# Patient Record
Sex: MALE | Race: WHITE | NOT HISPANIC OR LATINO | Employment: FULL TIME | URBAN - METROPOLITAN AREA
[De-identification: names, ages, dates, MRNs, and addresses within clinical notes are randomized per-mention and may not be internally consistent; named-entity substitution may affect disease eponyms.]

---

## 2017-01-11 ENCOUNTER — APPOINTMENT (OUTPATIENT)
Dept: CT IMAGING | Facility: CLINIC | Age: 32
End: 2017-01-11
Attending: EMERGENCY MEDICINE
Payer: COMMERCIAL

## 2017-01-11 ENCOUNTER — APPOINTMENT (OUTPATIENT)
Dept: GENERAL RADIOLOGY | Facility: CLINIC | Age: 32
End: 2017-01-11
Attending: EMERGENCY MEDICINE
Payer: COMMERCIAL

## 2017-01-11 ENCOUNTER — HOSPITAL ENCOUNTER (EMERGENCY)
Facility: CLINIC | Age: 32
Discharge: HOME | End: 2017-01-11
Attending: EMERGENCY MEDICINE | Admitting: EMERGENCY MEDICINE
Payer: COMMERCIAL

## 2017-01-11 VITALS
RESPIRATION RATE: 16 BRPM | OXYGEN SATURATION: 96 % | DIASTOLIC BLOOD PRESSURE: 93 MMHG | HEART RATE: 69 BPM | SYSTOLIC BLOOD PRESSURE: 137 MMHG | TEMPERATURE: 97.5 F

## 2017-01-11 DIAGNOSIS — R07.9 NONSPECIFIC CHEST PAIN: ICD-10-CM

## 2017-01-11 DIAGNOSIS — R20.2 PARESTHESIA: ICD-10-CM

## 2017-01-11 LAB
ALBUMIN SERPL-MCNC: 3.8 G/DL (ref 3.4–5)
ALP SERPL-CCNC: 97 U/L (ref 40–150)
ALT SERPL W P-5'-P-CCNC: 32 U/L (ref 0–70)
ANION GAP SERPL CALCULATED.3IONS-SCNC: 8 MMOL/L (ref 3–14)
AST SERPL W P-5'-P-CCNC: 18 U/L (ref 0–45)
BASOPHILS # BLD AUTO: 0.1 10E9/L (ref 0–0.2)
BASOPHILS NFR BLD AUTO: 0.6 %
BILIRUB SERPL-MCNC: 0.3 MG/DL (ref 0.2–1.3)
BUN SERPL-MCNC: 15 MG/DL (ref 7–30)
CALCIUM SERPL-MCNC: 8.8 MG/DL (ref 8.5–10.1)
CHLORIDE SERPL-SCNC: 106 MMOL/L (ref 94–109)
CO2 SERPL-SCNC: 27 MMOL/L (ref 20–32)
CREAT SERPL-MCNC: 1.07 MG/DL (ref 0.66–1.25)
D DIMER PPP FEU-MCNC: 0.3 UG/ML FEU (ref 0–0.5)
DIFFERENTIAL METHOD BLD: NORMAL
EOSINOPHIL # BLD AUTO: 0.2 10E9/L (ref 0–0.7)
EOSINOPHIL NFR BLD AUTO: 1.5 %
ERYTHROCYTE [DISTWIDTH] IN BLOOD BY AUTOMATED COUNT: 13.5 % (ref 10–15)
GFR SERPL CREATININE-BSD FRML MDRD: 80 ML/MIN/1.7M2
GLUCOSE SERPL-MCNC: 107 MG/DL (ref 70–99)
HCT VFR BLD AUTO: 44.2 % (ref 40–53)
HGB BLD-MCNC: 14.3 G/DL (ref 13.3–17.7)
IMM GRANULOCYTES # BLD: 0 10E9/L (ref 0–0.4)
IMM GRANULOCYTES NFR BLD: 0.4 %
INTERPRETATION ECG - MUSE: NORMAL
LIPASE SERPL-CCNC: 101 U/L (ref 73–393)
LYMPHOCYTES # BLD AUTO: 3.4 10E9/L (ref 0.8–5.3)
LYMPHOCYTES NFR BLD AUTO: 32.2 %
MCH RBC QN AUTO: 27.8 PG (ref 26.5–33)
MCHC RBC AUTO-ENTMCNC: 32.4 G/DL (ref 31.5–36.5)
MCV RBC AUTO: 86 FL (ref 78–100)
MONOCYTES # BLD AUTO: 0.7 10E9/L (ref 0–1.3)
MONOCYTES NFR BLD AUTO: 6.6 %
NEUTROPHILS # BLD AUTO: 6.1 10E9/L (ref 1.6–8.3)
NEUTROPHILS NFR BLD AUTO: 58.7 %
NRBC # BLD AUTO: 0 10*3/UL
NRBC BLD AUTO-RTO: 0 /100
PLATELET # BLD AUTO: 265 10E9/L (ref 150–450)
POTASSIUM SERPL-SCNC: 3.9 MMOL/L (ref 3.4–5.3)
PROT SERPL-MCNC: 7.5 G/DL (ref 6.8–8.8)
RBC # BLD AUTO: 5.14 10E12/L (ref 4.4–5.9)
SODIUM SERPL-SCNC: 141 MMOL/L (ref 133–144)
TROPONIN I SERPL-MCNC: NORMAL UG/L (ref 0–0.04)
WBC # BLD AUTO: 10.4 10E9/L (ref 4–11)

## 2017-01-11 PROCEDURE — 25500064 ZZH RX 255 OP 636: Performed by: EMERGENCY MEDICINE

## 2017-01-11 PROCEDURE — 93005 ELECTROCARDIOGRAM TRACING: CPT

## 2017-01-11 PROCEDURE — 96361 HYDRATE IV INFUSION ADD-ON: CPT

## 2017-01-11 PROCEDURE — 25000132 ZZH RX MED GY IP 250 OP 250 PS 637: Performed by: EMERGENCY MEDICINE

## 2017-01-11 PROCEDURE — 71020 XR CHEST 2 VW: CPT

## 2017-01-11 PROCEDURE — 25000125 ZZHC RX 250: Performed by: EMERGENCY MEDICINE

## 2017-01-11 PROCEDURE — 96374 THER/PROPH/DIAG INJ IV PUSH: CPT | Mod: 59

## 2017-01-11 PROCEDURE — 25000128 H RX IP 250 OP 636: Performed by: EMERGENCY MEDICINE

## 2017-01-11 PROCEDURE — 83690 ASSAY OF LIPASE: CPT | Performed by: EMERGENCY MEDICINE

## 2017-01-11 PROCEDURE — 84484 ASSAY OF TROPONIN QUANT: CPT | Performed by: EMERGENCY MEDICINE

## 2017-01-11 PROCEDURE — 85025 COMPLETE CBC W/AUTO DIFF WBC: CPT | Performed by: EMERGENCY MEDICINE

## 2017-01-11 PROCEDURE — 99285 EMERGENCY DEPT VISIT HI MDM: CPT | Mod: 25

## 2017-01-11 PROCEDURE — 80053 COMPREHEN METABOLIC PANEL: CPT | Performed by: EMERGENCY MEDICINE

## 2017-01-11 PROCEDURE — 74177 CT ABD & PELVIS W/CONTRAST: CPT

## 2017-01-11 PROCEDURE — 85379 FIBRIN DEGRADATION QUANT: CPT | Performed by: EMERGENCY MEDICINE

## 2017-01-11 RX ORDER — ASPIRIN 325 MG
325 TABLET ORAL ONCE
Status: COMPLETED | OUTPATIENT
Start: 2017-01-11 | End: 2017-01-11

## 2017-01-11 RX ORDER — IOPAMIDOL 755 MG/ML
500 INJECTION, SOLUTION INTRAVASCULAR ONCE
Status: COMPLETED | OUTPATIENT
Start: 2017-01-11 | End: 2017-01-11

## 2017-01-11 RX ORDER — SODIUM CHLORIDE 9 MG/ML
1000 INJECTION, SOLUTION INTRAVENOUS CONTINUOUS
Status: DISCONTINUED | OUTPATIENT
Start: 2017-01-11 | End: 2017-01-11 | Stop reason: HOSPADM

## 2017-01-11 RX ORDER — HYDROMORPHONE HYDROCHLORIDE 1 MG/ML
0.5 INJECTION, SOLUTION INTRAMUSCULAR; INTRAVENOUS; SUBCUTANEOUS
Status: DISCONTINUED | OUTPATIENT
Start: 2017-01-11 | End: 2017-01-11 | Stop reason: HOSPADM

## 2017-01-11 RX ADMIN — SODIUM CHLORIDE 1000 ML: 9 INJECTION, SOLUTION INTRAVENOUS at 01:24

## 2017-01-11 RX ADMIN — IOPAMIDOL 86 ML: 755 INJECTION, SOLUTION INTRAVENOUS at 02:34

## 2017-01-11 RX ADMIN — SODIUM CHLORIDE 62 ML: 9 INJECTION, SOLUTION INTRAVENOUS at 02:34

## 2017-01-11 RX ADMIN — ASPIRIN 325 MG ORAL TABLET 325 MG: 325 PILL ORAL at 01:19

## 2017-01-11 RX ADMIN — LIDOCAINE HYDROCHLORIDE 30 ML: 20 SOLUTION ORAL; TOPICAL at 01:20

## 2017-01-11 RX ADMIN — HYDROMORPHONE HYDROCHLORIDE 0.5 MG: 1 INJECTION, SOLUTION INTRAMUSCULAR; INTRAVENOUS; SUBCUTANEOUS at 02:11

## 2017-01-11 ASSESSMENT — ENCOUNTER SYMPTOMS
NAUSEA: 1
SHORTNESS OF BREATH: 1
VOMITING: 0
COUGH: 0
FEVER: 0
DIZZINESS: 0
ABDOMINAL PAIN: 0
DIAPHORESIS: 1

## 2017-01-11 NOTE — ED AVS SNAPSHOT
[facility] Emergency Department    [address]    [facility] [address]    Phone:  [phone]    Fax:  [phone]                                       Jacinto Cruz   MRN: [id number]    Department:  [facility] Emergency Department   Date of Visit:  1/11/2017           Patient Information     Date Of Birth          1985        Your diagnoses for this visit were:     Nonspecific chest pain     Paresthesia        You were seen by Rosangela Bridges MD.      Follow-up Information     Follow up with No Ref-Primary, Physician. Schedule an appointment as soon as possible for a visit in 2 days.        Discharge Instructions       Your tests did not show a specific cause of your symptoms, but your tests show no signs of a specific/emergency cause due to the heart, lungs or abdomen. It is possible you may have a strained muscle and a pinched nerve in the neck.    For pain: take Tylenol 650-1000mg up to every 6 hours with food. No strenuous activity with the arm.     If you have worse or different symptoms seek medical care right away.       *CHEST PAIN, UNCERTAIN CAUSE    Based on your exam today, the exact cause of your chest pain is not certain. Your condition does not seem serious at this time, and your pain does not appear to be coming from your heart. However, sometimes the signs of a serious problem take more time to appear. Therefore, watch for the warning signs listed below.  HOME CARE:    Rest today and avoid strenuous activity.    Take any prescribed medicine as directed.  FOLLOW UP with your doctor in 1-3 days.   GET PROMPT MEDICAL ATTENTION if any of the following occur:    A change in the type of pain: if it feels different, becomes more severe, lasts longer, or begins to spread into your shoulder, arm, neck, jaw or back    Shortness of breath or increased pain with breathing    Weakness, dizziness, or fainting    Cough with blood or dark colored sputum  (phlegm)    Fever over 101  F (38.3  C)    Swelling, pain or redness in one leg    2754-1224 [facility], [address] [place] [address], [place] [address]. All rights reserved. This information is not intended as a substitute for professional medical care. Always follow your healthcare professional's instructions.      Pinched Nerve in the Neck  A pinched nerve in the neck (cervical radiculopathy) is caused when the nerve that goes from the spinal cord to the arm is irritated or has pressure on it. This may be caused by a bulging spinal disk. A spinal disk is the cushion between each spinal bone. Or it may be caused by a narrowing of the spinal joint because of arthritis.    A pinched nerve can cause numbness, tingling, deep aching, or electrical shooting pain from the side of the neck all the way down to the fingers on one side.  A pinched nerve may begin after a sudden turning or bending force (such as in a car accident) or after a simple awkward movement. In either case, muscle spasm is commonly present and adds to the pain.  Home care  Follow these guidelines when caring for yourself at home:    Rest and relax the muscles. Use a comfortable pillow that supports your head and keeps your spine in a natural (neutral) position. Your head shouldn t be tilted forward or backward. A rolled-up towel may help for a custom fit. When standing or sitting, keep your neck in line with your body. Keep your head up and shoulders down. Stay away from activities that require you to move your neck a lot.    You can use heat and massage to help ease the pain. Take a hot shower or bath, or use a heating pad. You can also use a cold pack for relief. You can make a cold pack by wrapping a plastic bag of crushed or cubed ice in a thin towel. Try both heat and cold, and use the method that feels best. Do this for 20 minutes several times a day.    You may use acetaminophen or ibuprofen to control pain, unless another pain medicine was  prescribed. If you have chronic liver or kidney disease, talk with your health care provider before using these medicines. Also talk with your provider if you ve had a stomach ulcer or GI bleeding.    Reduce stress. Stress can make it longer for your pain to go away.    Do any exercises or stretches that were given to you as part of your discharge plan.    Wear a soft collar, if prescribed.    You may need surgery for a more serious injury.  Follow-up care  Follow up with your health care provider, or as advised, if you don t start to get better after 1 week. You may need more tests. Tell your provider about any fever, chills, or weight loss.  If X-rays were taken, a radiologist will look at them. You will be told of any new findings that may affect your care.  When to seek medical advice  Call your health care provider right away if any of these occur:    Pain becomes worse even after taking prescribed pain medicine    Weakness in the arm    Numbness in the arm gets worse    Trouble breathing or swallowing       1568-8626 [employer]. [address] [place] [address]. All rights reserved. This information is not intended as a substitute for professional medical care. Always follow your healthcare professional's instructions.          24 Hour Appointment Hotline       To make an appointment at any [facility], call [phone] ([phone]). If you don't have a family doctor or clinic, we will help you find one. [facility] clinics are conveniently located to serve the needs of you and your family.             Review of your medicines      Notice     You have not been prescribed any medications.            Procedures and tests performed during your visit     CBC with platelets differential    CT Abdomen Pelvis w Contrast    Comprehensive metabolic panel    D dimer quantitative    EKG 12 lead    Lipase    Troponin I    XR Chest 2 Views      Orders Needing Specimen Collection     None       Pending Results     Date and Time Order Name Status Description    1/11/2017 0045 EKG 12 lead Preliminary             Pending Culture Results     No orders found from 1/10/2017 to 1/12/2017.       Test Results from your hospital stay           1/11/2017  1:26 AM - Interface, Flexilab Results      Component Results     Component Value Ref Range & Units Status    WBC 10.4 4.0 - 11.0 10e9/L Final    RBC Count 5.14 4.4 - 5.9 10e12/L Final    Hemoglobin 14.3 13.3 - 17.7 g/dL Final    Hematocrit 44.2 40.0 - 53.0 % Final    MCV 86 78 - 100 fl Final    MCH 27.8 26.5 - 33.0 pg Final    MCHC 32.4 31.5 - 36.5 g/dL Final    RDW 13.5 10.0 - 15.0 % Final    Platelet Count 265 150 - 450 10e9/L Final    Diff Method Automated Method  Final    % Neutrophils 58.7 % Final    % Lymphocytes 32.2 % Final    % Monocytes 6.6 % Final    % Eosinophils 1.5 % Final    % Basophils 0.6 % Final    % Immature Granulocytes 0.4 % Final    Nucleated RBCs 0 0 /100 Final    Absolute Neutrophil 6.1 1.6 - 8.3 10e9/L Final    Absolute Lymphocytes 3.4 0.8 - 5.3 10e9/L Final    Absolute Monocytes 0.7 0.0 - 1.3 10e9/L Final    Absolute Eosinophils 0.2 0.0 - 0.7 10e9/L Final    Absolute Basophils 0.1 0.0 - 0.2 10e9/L Final    Abs Immature Granulocytes 0.0 0 - 0.4 10e9/L Final    Absolute Nucleated RBC 0.0  Final         1/11/2017  1:47 AM - Interface, Flexilab Results      Component Results     Component Value Ref Range & Units Status    Troponin I ES  0.000 - 0.045 ug/L Final    <0.015  The 99th percentile for upper reference range is 0.045 ug/L.  Troponin values in   the range of 0.045 - 0.120 ug/L may be associated with risks of adverse   clinical events.           1/11/2017  1:50 AM - Interface, Flexilab Results      Component Results     Component Value Ref Range & Units Status    Sodium 141 133 - 144 mmol/L Final    Potassium 3.9 3.4 - 5.3 mmol/L Final    Chloride 106 94 - 109 mmol/L Final    Carbon Dioxide 27 20 - 32 mmol/L Final    Anion Gap 8 3 - 14  mmol/L Final    Glucose 107 (H) 70 - 99 mg/dL Final    Urea Nitrogen 15 7 - 30 mg/dL Final    Creatinine 1.07 0.66 - 1.25 mg/dL Final    GFR Estimate 80 >60 mL/min/1.7m2 Final    Non  GFR Calc    GFR Estimate If Black >90   GFR Calc   >60 mL/min/1.7m2 Final    Calcium 8.8 8.5 - 10.1 mg/dL Final    Bilirubin Total 0.3 0.2 - 1.3 mg/dL Final    Albumin 3.8 3.4 - 5.0 g/dL Final    Protein Total 7.5 6.8 - 8.8 g/dL Final    Alkaline Phosphatase 97 40 - 150 U/L Final    ALT 32 0 - 70 U/L Final    AST 18 0 - 45 U/L Final         1/11/2017  1:47 AM - Interface, Flexilab Results      Component Results     Component Value Ref Range & Units Status    Lipase 101 73 - 393 U/L Final         1/11/2017  1:39 AM - Interface, Flexilab Results      Component Results     Component Value Ref Range & Units Status    D Dimer 0.3 0.0 - 0.50 ug/ml FEU Final    This D-dimer assay is intended for use in conjuntion with a clinical pretest   probability assessment model to exclude pulmonary embolism (PE) and as an aid   in the diagnosis of deep venous thrombosis (DVT) in outpatients suspected of   PE   or DVT. The cut-off value is 0.5 g/mL FEU.           1/11/2017  3:03 AM - Interface, Radiant Ib      Narrative     CHEST 2 VIEWS   1/11/2017 2:56 AM     HISTORY: Chest pain.    COMPARISON: None.    FINDINGS: The lungs are clear. Normal-sized cardiac silhouette.        Impression     IMPRESSION: No evidence of active cardiopulmonary disease.    CHRISTINE ZHU MD         1/11/2017  3:05 AM - Interface, Radiant Ib      Narrative     CT ABDOMEN AND PELVIS WITH CONTRAST   1/11/2017 2:44 AM     HISTORY: Upper abdominal/chest pain.    COMPARISON: None.    TECHNIQUE: Following the uneventful administration of 86 mL Isovue-370  intravenous contrast, helical sections were acquired from the top of  the diaphragm through the pubic symphysis. Coronal reconstructions  were generated. Radiation dose for this scan was reduced  using  automated exposure control, adjustment of the mA and/or kV according  to the patient's size, or iterative reconstruction technique.    FINDINGS:     Abdomen: A few subcentimeter low-attenuation lesions in the liver, too  small to characterize. The spleen, pancreas, adrenal glands and  kidneys are unremarkable. The gallbladder is present. No enlarged  lymph nodes or free fluid in the upper abdomen.    Scan through the lower chest is unremarkable.    Pelvis: The small and large bowel are normal in caliber. The appendix  is unremarkable. No bowel wall thickening, pneumatosis or free  intraperitoneal gas. No enlarged lymph nodes or free fluid in the  pelvis. Tiny paraumbilical hernia containing fat.        Impression     IMPRESSION: No cause of acute pain identified in the abdomen or  pelvis. The appendix is unremarkable.    CHRISTINE ZHU MD                Clinical Quality Measure: Blood Pressure Screening     Your blood pressure was checked while you were in the emergency department today. The last reading we obtained was  BP: (!) 137/93 mmHg . Please read the guidelines below about what these numbers mean and what you should do about them.  If your systolic blood pressure (the top number) is less than 120 and your diastolic blood pressure (the bottom number) is less than 80, then your blood pressure is normal. There is nothing more that you need to do about it.  If your systolic blood pressure (the top number) is 120-139 or your diastolic blood pressure (the bottom number) is 80-89, your blood pressure may be higher than it should be. You should have your blood pressure rechecked within a year by a primary care provider.  If your systolic blood pressure (the top number) is 140 or greater or your diastolic blood pressure (the bottom number) is 90 or greater, you may have high blood pressure. High blood pressure is treatable, but if left untreated over time it can put you at risk for heart attack, stroke, or  "kidney failure. You should have your blood pressure rechecked by a primary care provider within the next 4 weeks.  If your provider in the emergency department today gave you specific instructions to follow-up with your doctor or provider even sooner than that, you should follow that instruction and not wait for up to 4 weeks for your follow-up visit.        Thank you for choosing [facility]       Thank you for choosing [facility] for your care. Our goal is always to provide you with excellent care. Hearing back from our patients is one way we can continue to improve our services. Please take a few minutes to complete the written survey that you may receive in the mail after you visit with us. Thank you!        [employer] Information     [employer] lets you send messages to your doctor, view your test results, renew your prescriptions, schedule appointments and more. To sign up, go to [email or web address] . Click on \"Log in\" on the left side of the screen, which will take you to the Welcome page. Then click on \"Sign up Now\" on the right side of the page.     You will be asked to enter the access code listed below, as well as some personal information. Please follow the directions to create your username and password.     Your access code is: [email or web address]  Expires: 2017  3:23 AM     Your access code will  in 90 days. If you need help or a new code, please call your [facility] clinic or [phone].        Care EveryWhere ID     This is your Care EveryWhere ID. This could be used by other organizations to access your [facility] medical records  [id number]        After Visit Summary       This is your record. Keep this with you and show to your community pharmacist(s) and doctor(s) at your next visit.                  "

## 2017-01-11 NOTE — ED NOTES
Patient here for evaluation of chest pain that started yesterday in the morning. The pain has been constant since and is radiating to left arm and hand. Patient denies jaw pain, headache, or nausea or vomiting. Patient is AOX4, ABC's intact.

## 2017-01-11 NOTE — ED AVS SNAPSHOT
[facility] Emergency Department    [address]    [facility] [address]    Phone:  [phone]    Fax:  [phone]                                       Jacinto Cruz   MRN: [id number]    Department:  [facility] Emergency Department   Date of Visit:  1/11/2017           After Visit Summary Signature Page     I have received my discharge instructions, and my questions have been answered. I have discussed any challenges I see with this plan with the nurse or doctor.    ..........................................................................................................................................  Patient/Patient Representative Signature      ..........................................................................................................................................  Patient Representative Print Name and Relationship to Patient    ..................................................               ................................................  Date                                            Time    ..........................................................................................................................................  Reviewed by Signature/Title    ...................................................              ..............................................  Date                                                            Time

## 2017-01-11 NOTE — DISCHARGE INSTRUCTIONS
Your tests did not show a specific cause of your symptoms, but your tests show no signs of a specific/emergency cause due to the heart, lungs or abdomen. It is possible you may have a strained muscle and a pinched nerve in the neck.    For pain: take Tylenol 650-1000mg up to every 6 hours with food. No strenuous activity with the arm.     If you have worse or different symptoms seek medical care right away.       *CHEST PAIN, UNCERTAIN CAUSE    Based on your exam today, the exact cause of your chest pain is not certain. Your condition does not seem serious at this time, and your pain does not appear to be coming from your heart. However, sometimes the signs of a serious problem take more time to appear. Therefore, watch for the warning signs listed below.  HOME CARE:    Rest today and avoid strenuous activity.    Take any prescribed medicine as directed.  FOLLOW UP with your doctor in 1-3 days.   GET PROMPT MEDICAL ATTENTION if any of the following occur:    A change in the type of pain: if it feels different, becomes more severe, lasts longer, or begins to spread into your shoulder, arm, neck, jaw or back    Shortness of breath or increased pain with breathing    Weakness, dizziness, or fainting    Cough with blood or dark colored sputum (phlegm)    Fever over 101  F (38.3  C)    Swelling, pain or redness in one leg    5382-3311 [place] [address]. All rights reserved. This information is not intended as a substitute for professional medical care. Always follow your healthcare professional's instructions.      Pinched Nerve in the Neck  A pinched nerve in the neck (cervical radiculopathy) is caused when the nerve that goes from the spinal cord to the arm is irritated or has pressure on it. This may be caused by a bulging spinal disk. A spinal disk is the cushion between each spinal bone. Or it may be caused by a narrowing of the spinal joint because of arthritis.    A pinched  nerve can cause numbness, tingling, deep aching, or electrical shooting pain from the side of the neck all the way down to the fingers on one side.  A pinched nerve may begin after a sudden turning or bending force (such as in a car accident) or after a simple awkward movement. In either case, muscle spasm is commonly present and adds to the pain.  Home care  Follow these guidelines when caring for yourself at home:    Rest and relax the muscles. Use a comfortable pillow that supports your head and keeps your spine in a natural (neutral) position. Your head shouldn t be tilted forward or backward. A rolled-up towel may help for a custom fit. When standing or sitting, keep your neck in line with your body. Keep your head up and shoulders down. Stay away from activities that require you to move your neck a lot.    You can use heat and massage to help ease the pain. Take a hot shower or bath, or use a heating pad. You can also use a cold pack for relief. You can make a cold pack by wrapping a plastic bag of crushed or cubed ice in a thin towel. Try both heat and cold, and use the method that feels best. Do this for 20 minutes several times a day.    You may use acetaminophen or ibuprofen to control pain, unless another pain medicine was prescribed. If you have chronic liver or kidney disease, talk with your health care provider before using these medicines. Also talk with your provider if you ve had a stomach ulcer or GI bleeding.    Reduce stress. Stress can make it longer for your pain to go away.    Do any exercises or stretches that were given to you as part of your discharge plan.    Wear a soft collar, if prescribed.    You may need surgery for a more serious injury.  Follow-up care  Follow up with your health care provider, or as advised, if you don t start to get better after 1 week. You may need more tests. Tell your provider about any fever, chills, or weight loss.  If X-rays were taken, a radiologist will  look at them. You will be told of any new findings that may affect your care.  When to seek medical advice  Call your health care provider right away if any of these occur:    Pain becomes worse even after taking prescribed pain medicine    Weakness in the arm    Numbness in the arm gets worse    Trouble breathing or swallowing       9222-7838 [employer]. [address] [place] [address], [place] [address]. All rights reserved. This information is not intended as a substitute for professional medical care. Always follow your healthcare professional's instructions.

## 2017-01-11 NOTE — ED PROVIDER NOTES
History     Chief Complaint:  Chest Pain    HPI   Jacinto Cruz is a 31 year lefthanded old male who presents to the emergency department today for evaluation of chest pain. The patient reports that Monday morning 1/9/2017 he started experiencing some chest pain on the left side which seems to radiate into his arm and shoulder. He states that when he rolls onto his side it seems to be worse and is not exacerbated with exertion. 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 Furthermore, the patient notes some tingling in his left hand and has been diaphoretic intermittently since Monday. Moreover, the patient does note that he went to [place] on December 27th and did drive for longer than 4 hours at a time, but he denies any calf pain or leg swelling. He also denies any cough, jaw pain, emesis, fevers, abdominal pain, or other symptoms at this time.     Cardiac/PE/DVT Risk Factors:  History of hypertension - Negative  History of hyperlipidemia - Negative  History of diabetes - Negative  History of smoking - Positive  Personal history of PE/DVT - Negative  Family history of PE/DVT - Negative  Family history of heart complications - Negative  Recent travel - Positive  Recent surgery - Negative  Other immobilizations - Negative  Cancer - Negative    Allergies:  No Known Drug Allergies     Medications:    The patient is currently on no regular medications.      Past Medical History:    GERD    Past Surgical History:    Left elbow surgery  Discectomy lumbar posterior microscopic one level, left (2015)     Family History:    History reviewed. No pertinent family history.     Social History:  The patient was accompanied to the ED by his girlfriend.  Smoking Status: Former Smoker  Alcohol Use: Positive  Marital Status:  Single [1]   Drug Use: Former cannabis and cocaine, not for several years    Review of  Systems   Constitutional: Positive for diaphoresis. Negative for fever.   Respiratory: Positive for shortness of breath. Negative for cough.    Cardiovascular: Positive for chest pain (Left side radiating into left arm/shoulder). Negative for leg swelling.   Gastrointestinal: Positive for nausea. Negative for vomiting and abdominal pain.   Neurological: Negative for dizziness.   All other systems reviewed and are negative.    Physical Exam   Vitals:  Patient Vitals for the past 24 hrs:   BP Temp Temp src Pulse Heart Rate Resp SpO2   01/11/17 0115 (!) 137/93 mmHg - - - 71 - 96 %   01/11/17 0100 (!) 146/113 mmHg - - - 74 - 96 %   01/11/17 0045 (!) 147/101 mmHg - - - 77 - 98 %   01/11/17 0041 (!) 146/110 mmHg 97.5  F (36.4  C) Oral 69 - 16 98 %      Physical Exam  Constitutional:  Well developed, Well nourished, Completely comfortable appearing.   HENT:  Bilateral external ears normal, Mucous membranes moist, Nose normal. Neck- Normal range of motion, Supple  Respiratory:  Normal breath sounds, No respiratory distress, No wheezing,  Cardiovascular:  Normal heart rate, Normal rhythm, No murmurs,    GI:  Bowel sounds normal, Soft, No tenderness,   Musculoskeletal:  Intact distal pulses, No edema, grossly unremarkable range of motion, no calf tenderness.  Integument:  Warm, Dry   Neurologic:  Alert, attentive and appropriately oriented, minimally decreased sensation to light touch in the left pinky compared to right, equal sensation in the remainder of fingers and arms. Normal  strength bilaterally.   Psychiatric:  Mood and affect normal.      Emergency Department Course     ECG:  ECG taken at 0038, ECG read at 0136  normal sinus rhythm  Minimal voltage criteria for LVH, may be normal variant  Borderline ECG  Rate 73 bpm. OR interval 164. QRS duration 92. QT/QTc 384/423. P-R-T axes 64 20 23.    Imaging:  Radiology findings were communicated with the patient who voiced understanding of the findings.    Chest x-ray, 2  views:  No evidence of active cardiopulmonary disease.  Reading per radiology    Abdomen/Pelvis CT with contrast:  No cause of acute pain identified in the abdomen or  pelvis. The appendix is unremarkable.  Reading per radiology    Laboratory:  Laboratory findings were communicated with the patient who voiced understanding of the findings.    CBC: AWNL. (WBC 10.4, HGB 14.3, )   Troponin (Collected 0045): <0.015  CMP: Glucose: 107 (H) (Creatinine 1.07)  Lipase: 101  D Dimer (Collected 0045): 0.3    Interventions:  0119 Aspirin 325 mg PO  0120 GI Cocktail 30 mL PO  0124 ns 1000 mL IV  0211 Dilaudid 0.5 mg IV     Emergency Department Course:  Nursing notes and vitals reviewed.  I performed an exam of the patient as documented above.   The patient was sent for a Chest x-ray and an Abdomen/Pelvis CT with contrast while in the emergency department, results above.   IV was inserted and blood was drawn for laboratory testing, results above.  At 0316 the patient was rechecked and was updated on the results of his laboratory and imaging studies. He reports resolution of his chest pain after IV Dilaudid temporarily; notes mild recurrence after CT, but no different or worsening symptoms.  I discussed the treatment plan with the patient. He expressed understanding of this plan and consented to discharge. He will be discharged home with instructions for care and follow up. In addition, the patient will return to the emergency department if their symptoms persist, worsen, if new symptoms arise or if there is any concern.  All questions were answered.  I personally reviewed the laboratory and imaging results with the patient and answered all related questions prior to discharge.    Impression & Plan      Medical Decision Making:  Jacinto Cruz presents with chest pain.  The work up in the Emergency Department is negative.  The differential diagnosis of chest pain is broad and includes life threatening etiologies such as  acute coronary syndrome, myocardial infarction, pulmonary embolism, and acute aortic dissection.  Other causes may include pneumonia, pneumothorax, pericarditis, pleurisy, musculoskeletal and esophageal spasm.  No serious etiology for the chest pain was detected today during this visit.     The patient is also concerned for a tingling sensation in his left hand.  A broad differential for the paresthesias was considered including acute CVA, demyelinating disease, nerve compression, developing neuropathy, early shingles, among others. The workup here shows an normal neurologic examination other than the paresthesias noted above in exam. It was localized to one side of the hand on his dominant side;  Based on this I feel the most likely etiology of his paresthesias is a peripheral nerve paresthesia.     Close follow up with primary care is indicated, as further work up may be performed; this was made clear to Jacinto, who understands. He and his significant other are comfortable with plan.    Diagnosis:    ICD-10-CM    1. Nonspecific chest pain R07.9    2. Paresthesia R20.2        Scribe Disclosure:  Chaon PASCAL, am serving as a scribe at 12:58 AM on 1/11/2017 to document services personally performed by Rosangela Bridges MD, based on my observations and the provider's statements to me.    1/11/2017   [facility] EMERGENCY DEPARTMENT        Rosangela Bridges MD  01/11/17 0542

## 2017-01-11 NOTE — LETTER
[facility] EMERGENCY DEPARTMENT  [address]  [facility] [address]  [phone]    Jacinto Cruz  [address] [facility] [address]  [phone] (home)     : 1985      To Whom it may concern:    Jacinto Anthony was seen in our Emergency Department today, 2017.   Sincerely,        Rosangela Bridges

## 2017-07-26 ENCOUNTER — OFFICE VISIT (OUTPATIENT)
Dept: FAMILY MEDICINE | Facility: CLINIC | Age: 32
End: 2017-07-26
Payer: COMMERCIAL

## 2017-07-26 VITALS
SYSTOLIC BLOOD PRESSURE: 136 MMHG | BODY MASS INDEX: 23.91 KG/M2 | WEIGHT: 167 LBS | DIASTOLIC BLOOD PRESSURE: 88 MMHG | HEIGHT: 70 IN | HEART RATE: 75 BPM | TEMPERATURE: 98.3 F

## 2017-07-26 DIAGNOSIS — G44.219 EPISODIC TENSION-TYPE HEADACHE, NOT INTRACTABLE: ICD-10-CM

## 2017-07-26 DIAGNOSIS — G62.9 POLYNEUROPATHY: ICD-10-CM

## 2017-07-26 DIAGNOSIS — R03.0 ELEVATED BLOOD PRESSURE READING WITHOUT DIAGNOSIS OF HYPERTENSION: ICD-10-CM

## 2017-07-26 DIAGNOSIS — Z00.00 ENCOUNTER FOR ROUTINE ADULT HEALTH EXAMINATION WITHOUT ABNORMAL FINDINGS: Primary | ICD-10-CM

## 2017-07-26 LAB
ERYTHROCYTE [DISTWIDTH] IN BLOOD BY AUTOMATED COUNT: 14.5 % (ref 10–15)
HBA1C MFR BLD: 4.9 % (ref 4.3–6)
HCT VFR BLD AUTO: 40.6 % (ref 40–53)
HGB BLD-MCNC: 13.1 G/DL (ref 13.3–17.7)
MCH RBC QN AUTO: 27.8 PG (ref 26.5–33)
MCHC RBC AUTO-ENTMCNC: 32.3 G/DL (ref 31.5–36.5)
MCV RBC AUTO: 86 FL (ref 78–100)
PLATELET # BLD AUTO: 208 10E9/L (ref 150–450)
RBC # BLD AUTO: 4.71 10E12/L (ref 4.4–5.9)
WBC # BLD AUTO: 6 10E9/L (ref 4–11)

## 2017-07-26 PROCEDURE — 82607 VITAMIN B-12: CPT | Performed by: FAMILY MEDICINE

## 2017-07-26 PROCEDURE — 84443 ASSAY THYROID STIM HORMONE: CPT | Performed by: FAMILY MEDICINE

## 2017-07-26 PROCEDURE — 85027 COMPLETE CBC AUTOMATED: CPT | Performed by: FAMILY MEDICINE

## 2017-07-26 PROCEDURE — 36415 COLL VENOUS BLD VENIPUNCTURE: CPT | Performed by: FAMILY MEDICINE

## 2017-07-26 PROCEDURE — 83036 HEMOGLOBIN GLYCOSYLATED A1C: CPT | Performed by: FAMILY MEDICINE

## 2017-07-26 PROCEDURE — 99213 OFFICE O/P EST LOW 20 MIN: CPT | Mod: 25 | Performed by: FAMILY MEDICINE

## 2017-07-26 PROCEDURE — 80053 COMPREHEN METABOLIC PANEL: CPT | Performed by: FAMILY MEDICINE

## 2017-07-26 PROCEDURE — 99395 PREV VISIT EST AGE 18-39: CPT | Performed by: FAMILY MEDICINE

## 2017-07-26 PROCEDURE — 82306 VITAMIN D 25 HYDROXY: CPT | Performed by: FAMILY MEDICINE

## 2017-07-26 NOTE — PROGRESS NOTES
SUBJECTIVE:   CC: Jacinto Cruz is an 32 year old male who presents for preventative health visit.     Healthy Habits:    Do you get at least three servings of calcium containing foods daily (dairy, green leafy vegetables, etc.)? yes    Amount of exercise or daily activities, outside of work: daily walking    Problems taking medications regularly No    Medication side effects: No    Have you had an eye exam in the past two years? yes    Do you see a dentist twice per year? no    Do you have sleep apnea, excessive snoring or daytime drowsiness? yes snoring      Has some concerns regarding diabetes. Sister with T1DM. He has been urinating more frequently. Cut out pop, more water. Change in urination has been since this time.     Also notes occasional numbness in his hands and feet.     Having frequent headaches, whole head. Ibu relieves the CAMPO. Taking daily anti-histamine as well. No photo or phonophobia. No blurry vision.         Today's PHQ-2 Score: PHQ-2 ( 1999 Pf[employer]) 7/26/2017 3/13/2015   Q1: Little interest or pleasure in doing things 0 0   Q2: Feeling down, depressed or hopeless 0 0   PHQ-2 Score 0 0         Abuse: Current or Past(Physical, Sexual or Emotional)- No  Do you feel safe in your environment - Yes  Social History   Substance Use Topics     Smoking status: Former Smoker     Smokeless tobacco: Former User      Comment: quit 5/8/14 at 1230     Alcohol use Yes      Comment: occ     The patient does not drink >3 drinks per day nor >7 drinks per week.    Last PSA: No results found for: PSA    Reviewed orders with patient. Reviewed health maintenance and updated orders accordingly - Yes  There is no problem list on file for this patient.    Past Surgical History:   Procedure Laterality Date     DISCECTOMY LUMBAR POSTERIOR MICROSCOPIC ONE LEVEL Left 3/16/2015    Procedure: DISCECTOMY LUMBAR POSTERIOR MICROSCOPIC ONE LEVEL;  Surgeon: Gm Griggs MD;  Location: [facility] OR     ORTHOPEDIC SURGERY      left  "elbow orif       Social History   Substance Use Topics     Smoking status: Former Smoker     Smokeless tobacco: Former User      Comment: quit 5/8/14 at 1230     Alcohol use Yes      Comment: occ     Family History   Problem Relation Age of Onset     DIABETES Sister              Reviewed and updated as needed this visit by clinical staffTobacco  Allergies  Med Hx  Surg Hx  Fam Hx  Soc Hx        Reviewed and updated as needed this visit by Provider        ROS:  C: NEGATIVE for fever, chills, change in weight  I: NEGATIVE for worrisome rashes, moles or lesions  E: NEGATIVE for vision changes or irritation  ENT: NEGATIVE for ear, mouth and throat problems  R: NEGATIVE for significant cough or SOB  CV: NEGATIVE for chest pain, palpitations or peripheral edema  GI: NEGATIVE for nausea, abdominal pain, heartburn, or change in bowel habits   male: negative for dysuria, hematuria, decreased urinary stream, erectile dysfunction, urethral discharge  M: NEGATIVE for significant arthralgias or myalgia  N: see above  P: NEGATIVE for changes in mood or affect    OBJECTIVE:   /88 (BP Location: Right arm, Patient Position: Right side, Cuff Size: Adult Regular)  Pulse 75  Temp 98.3  F (36.8  C) (Oral)  Ht 5' 9.75\" (1.772 m)  Wt 167 lb (75.8 kg)  BMI 24.13 kg/m2  EXAM:  GENERAL: healthy, alert and no distress  EYES: Eyes grossly normal to inspection, PERRL and conjunctivae and sclerae normal  HENT: ear canals and TM's normal, nose and mouth without ulcers or lesions  NECK: no adenopathy, no asymmetry, masses, or scars and thyroid normal to palpation  RESP: lungs clear to auscultation - no rales, rhonchi or wheezes  CV: regular rate and rhythm, normal S1 S2, no S3 or S4, no murmur, click or rub, no peripheral edema and peripheral pulses strong  ABDOMEN: soft, nontender, no hepatosplenomegaly, no masses and bowel sounds normal  MS: no gross musculoskeletal defects noted, no edema  SKIN: no suspicious lesions or " "rashes  NEURO: normal strength BLEs, normal sensation and patellar and achilles reflexes, mentation intact and speech normal  PSYCH: mentation appears normal, affect normal/bright    ASSESSMENT/PLAN:   1. Encounter for routine adult health examination without abnormal findings    2. Episodic tension-type headache, not intractable - discussed likely diagnosis. He has concerns that his neuropathic complaints and HA are associated. Will refer to Neurology for further work up.   - NEUROLOGY ADULT REFERRAL    3. Polyneuropathy (H) - no signs of weakness, loss of sensation today. Discussed lab work up to start.   - Comprehensive metabolic panel (BMP + Alb, Alk Phos, ALT, AST, Total. Bili, TP)  - CBC with platelets  - Hemoglobin A1c  - Vitamin B12  - Vitamin D Deficiency  - NEUROLOGY ADULT REFERRAL  - TSH    COUNSELING:  Reviewed preventive health counseling, as reflected in patient instructions     reports that he has quit smoking. He has quit using smokeless tobacco.      Estimated body mass index is 24.13 kg/(m^2) as calculated from the following:    Height as of this encounter: 5' 9.75\" (1.772 m).    Weight as of this encounter: 167 lb (75.8 kg).     Viridiana Rubio MD  [facility]  "

## 2017-07-26 NOTE — NURSING NOTE
"Chief Complaint   Patient presents with     Physical       Initial /88 (BP Location: Right arm, Patient Position: Right side, Cuff Size: Adult Regular)  Pulse 75  Temp 98.3  F (36.8  C) (Oral)  Ht 5' 9.75\" (1.772 m)  Wt 167 lb (75.8 kg)  BMI 24.13 kg/m2 Estimated body mass index is 24.13 kg/(m^2) as calculated from the following:    Height as of this encounter: 5' 9.75\" (1.772 m).    Weight as of this encounter: 167 lb (75.8 kg).  Medication Reconciliation: complete     Hardeep Alarcon CMA          "

## 2017-07-26 NOTE — LETTER
[facility]          [address] [place] [address] [phone]              Jacinto Cruz  [address] [facility] [address]      Dear Jacinto,    Your labs are within acceptable limits.  Please follow-up if symptoms fail to resolve or worsen.  Enclosed is a copy of the results.      Thank you for choosing [facility]. We appreciate the opportunity to serve you and look forward to supporting your healthcare needs in the future.    If you have any questions or concerns, please call me or my nurse at [phone].        Sincerely,      Yaima Rosa PA-C/Alejandra Sims   Results for orders placed or performed in visit on 07/26/17   Comprehensive metabolic panel (BMP + Alb, Alk Phos, ALT, AST, Total. Bili, TP)   Result Value Ref Range    Sodium 138 133 - 144 mmol/L    Potassium 4.6 3.4 - 5.3 mmol/L    Chloride 106 94 - 109 mmol/L    Carbon Dioxide 26 20 - 32 mmol/L    Anion Gap 6 3 - 14 mmol/L    Glucose 80 70 - 99 mg/dL    Urea Nitrogen 16 7 - 30 mg/dL    Creatinine 1.02 0.66 - 1.25 mg/dL    GFR Estimate 84 >60 mL/min/1.7m2    GFR Estimate If Black >90   GFR Calc   >60 mL/min/1.7m2    Calcium 9.5 8.5 - 10.1 mg/dL    Bilirubin Total 0.6 0.2 - 1.3 mg/dL    Albumin 4.2 3.4 - 5.0 g/dL    Protein Total 7.9 6.8 - 8.8 g/dL    Alkaline Phosphatase 74 40 - 150 U/L    ALT 29 0 - 70 U/L    AST 19 0 - 45 U/L   CBC with platelets   Result Value Ref Range    WBC 6.0 4.0 - 11.0 10e9/L    RBC Count 4.71 4.4 - 5.9 10e12/L    Hemoglobin 13.1 (L) 13.3 - 17.7 g/dL    Hematocrit 40.6 40.0 - 53.0 %    MCV 86 78 - 100 fl    MCH 27.8 26.5 - 33.0 pg    MCHC 32.3 31.5 - 36.5 g/dL    RDW 14.5 10.0 - 15.0 %    Platelet Count 208 150 - 450 10e9/L   Hemoglobin A1c   Result Value Ref Range    Hemoglobin A1C 4.9 4.3 - 6.0 %   Vitamin B12   Result Value Ref  Range    Vitamin B12 601 193 - 986 pg/mL   Vitamin D Deficiency   Result Value Ref Range    Vitamin D Deficiency screening 36 20 - 75 ug/L   TSH with free T4 reflex   Result Value Ref Range    TSH 0.60 0.40 - 4.00 mU/L

## 2017-07-27 LAB
ALBUMIN SERPL-MCNC: 4.2 G/DL (ref 3.4–5)
ALP SERPL-CCNC: 74 U/L (ref 40–150)
ALT SERPL W P-5'-P-CCNC: 29 U/L (ref 0–70)
ANION GAP SERPL CALCULATED.3IONS-SCNC: 6 MMOL/L (ref 3–14)
AST SERPL W P-5'-P-CCNC: 19 U/L (ref 0–45)
BILIRUB SERPL-MCNC: 0.6 MG/DL (ref 0.2–1.3)
BUN SERPL-MCNC: 16 MG/DL (ref 7–30)
CALCIUM SERPL-MCNC: 9.5 MG/DL (ref 8.5–10.1)
CHLORIDE SERPL-SCNC: 106 MMOL/L (ref 94–109)
CO2 SERPL-SCNC: 26 MMOL/L (ref 20–32)
CREAT SERPL-MCNC: 1.02 MG/DL (ref 0.66–1.25)
DEPRECATED CALCIDIOL+CALCIFEROL SERPL-MC: 36 UG/L (ref 20–75)
GFR SERPL CREATININE-BSD FRML MDRD: 84 ML/MIN/1.7M2
GLUCOSE SERPL-MCNC: 80 MG/DL (ref 70–99)
POTASSIUM SERPL-SCNC: 4.6 MMOL/L (ref 3.4–5.3)
PROT SERPL-MCNC: 7.9 G/DL (ref 6.8–8.8)
SODIUM SERPL-SCNC: 138 MMOL/L (ref 133–144)
TSH SERPL DL<=0.005 MIU/L-ACNC: 0.6 MU/L (ref 0.4–4)
VIT B12 SERPL-MCNC: 601 PG/ML (ref 193–986)

## 2018-03-10 ENCOUNTER — OFFICE VISIT (OUTPATIENT)
Dept: URGENT CARE | Facility: URGENT CARE | Age: 33
End: 2018-03-10
Payer: COMMERCIAL

## 2018-03-10 VITALS
HEART RATE: 84 BPM | TEMPERATURE: 98.4 F | OXYGEN SATURATION: 98 % | SYSTOLIC BLOOD PRESSURE: 130 MMHG | DIASTOLIC BLOOD PRESSURE: 82 MMHG

## 2018-03-10 DIAGNOSIS — R07.0 THROAT PAIN: Primary | ICD-10-CM

## 2018-03-10 LAB
BASOPHILS # BLD AUTO: 0 10E9/L (ref 0–0.2)
BASOPHILS NFR BLD AUTO: 0.3 %
DEPRECATED S PYO AG THROAT QL EIA: NORMAL
DEPRECATED S PYO AG THROAT QL EIA: NORMAL
DIFFERENTIAL METHOD BLD: NORMAL
EOSINOPHIL # BLD AUTO: 0.2 10E9/L (ref 0–0.7)
EOSINOPHIL NFR BLD AUTO: 2.2 %
ERYTHROCYTE [DISTWIDTH] IN BLOOD BY AUTOMATED COUNT: 14.7 % (ref 10–15)
HCT VFR BLD AUTO: 43 % (ref 40–53)
HGB BLD-MCNC: 13.7 G/DL (ref 13.3–17.7)
LYMPHOCYTES # BLD AUTO: 2 10E9/L (ref 0.8–5.3)
LYMPHOCYTES NFR BLD AUTO: 23.2 %
MCH RBC QN AUTO: 27.3 PG (ref 26.5–33)
MCHC RBC AUTO-ENTMCNC: 31.9 G/DL (ref 31.5–36.5)
MCV RBC AUTO: 86 FL (ref 78–100)
MONOCYTES # BLD AUTO: 0.7 10E9/L (ref 0–1.3)
MONOCYTES NFR BLD AUTO: 7.8 %
NEUTROPHILS # BLD AUTO: 5.8 10E9/L (ref 1.6–8.3)
NEUTROPHILS NFR BLD AUTO: 66.5 %
PLATELET # BLD AUTO: 232 10E9/L (ref 150–450)
RBC # BLD AUTO: 5.02 10E12/L (ref 4.4–5.9)
SPECIMEN SOURCE: NORMAL
WBC # BLD AUTO: 8.8 10E9/L (ref 4–11)

## 2018-03-10 PROCEDURE — 99214 OFFICE O/P EST MOD 30 MIN: CPT | Performed by: FAMILY MEDICINE

## 2018-03-10 PROCEDURE — 36415 COLL VENOUS BLD VENIPUNCTURE: CPT | Performed by: FAMILY MEDICINE

## 2018-03-10 PROCEDURE — 85025 COMPLETE CBC W/AUTO DIFF WBC: CPT | Performed by: FAMILY MEDICINE

## 2018-03-10 RX ORDER — AZITHROMYCIN 250 MG/1
TABLET, FILM COATED ORAL
Qty: 6 TABLET | Refills: 0 | Status: SHIPPED | OUTPATIENT
Start: 2018-03-10 | End: 2018-03-23

## 2018-03-10 NOTE — MR AVS SNAPSHOT
"              After Visit Summary   3/10/2018    Jacinto Cruz IV    MRN: [id number]           Patient Information     Date Of Birth          1985        Visit Information        Provider Department      3/10/2018 9:45 AM Shaheen Tapia MD [facility]        Today's Diagnoses     Throat pain    -  1       Follow-ups after your visit        Who to contact     If you have questions or need follow up information about today's clinic visit or your schedule please contact [facility] directly at [phone].  Normal or non-critical lab and imaging results will be communicated to you by [employer], letter or phone within 4 business days after the clinic has received the results. If you do not hear from us within 7 days, please contact the clinic through [employer] or phone. If you have a critical or abnormal lab result, we will notify you by phone as soon as possible.  Submit refill requests through [employer] or call your pharmacy and they will forward the refill request to us. Please allow 3 business days for your refill to be completed.          Additional Information About Your Visit        MyChart Information     [employer] lets you send messages to your doctor, view your test results, renew your prescriptions, schedule appointments and more. To sign up, go to [email or web address] . Click on \"Log in\" on the left side of the screen, which will take you to the Welcome page. Then click on \"Sign up Now\" on the right side of the page.     You will be asked to enter the access code listed below, as well as some personal information. Please follow the directions to create your username and password.     Your access code is: [email or web address]  Expires: 2018  3:22 PM     Your access code will  in 90 days. If you need help or a new code, please call your [place] clinic or [phone].        Care EveryWhere ID     This is your Care EveryWhere ID. This could be used by other " organizations to access your [facility] medical records  [id number]        Your Vitals Were     Pulse Temperature Pulse Oximetry             84 98.4  F (36.9  C) (Oral) 98%          Blood Pressure from Last 3 Encounters:   03/10/18 130/82   07/26/17 136/88   01/11/17 (!) 137/93    Weight from Last 3 Encounters:   07/26/17 167 lb (75.8 kg)   03/16/15 173 lb (78.5 kg)   03/13/15 173 lb (78.5 kg)              We Performed the Following     CBC with platelets and differential     Rapid strep screen          Today's Medication Changes          These changes are accurate as of 3/10/18 11:59 PM.  If you have any questions, ask your nurse or doctor.               Start taking these medicines.        Dose/Directions    azithromycin 250 MG tablet   Commonly known as:  ZITHROMAX   Used for:  Throat pain        Two tablets first day, then one tablet daily for four days.   Quantity:  6 tablet   Refills:  0       nabumetone 750 MG tablet   Commonly known as:  RELAFEN   Used for:  Throat pain        Dose:  750 mg   Take 1 tablet (750 mg) by mouth 2 times daily as needed for moderate pain   Quantity:  30 tablet   Refills:  1            Where to get your medicines      These medications were sent to [id number]    Hours:  Tech issues with their phone system Phone:  [phone]     azithromycin 250 MG tablet    nabumetone 750 MG tablet                Primary Care Provider Fax #    Physician No Ref-Primary [phone]       No address on file        Equal Access to Services     JONATHAN AGUIRRE : Ish Khanna, waaxda luqadaha, qaybta kaalmada chelsea, shane joseph. So [facility] [phone].    ATENCIÓN: Si habla español, tiene a amado disposición servicios gratuitos de asistencia lingüística. Llame al [phone].    We comply with applicable federal civil rights laws and [place] laws. We do not  discriminate on the basis of race, color, national origin, age, disability, sex, sexual orientation, or gender identity.            Thank you!     Thank you for choosing [facility]  for your care. Our goal is always to provide you with excellent care. Hearing back from our patients is one way we can continue to improve our services. Please take a few minutes to complete the written survey that you may receive in the mail after your visit with us. Thank you!             Your Updated Medication List - Protect others around you: Learn how to safely use, store and throw away your medicines at www.disposemymeds.org.          This list is accurate as of 3/10/18 11:59 PM.  Always use your most recent med list.                   Brand Name Dispense Instructions for use Diagnosis    ADVIL ALLERGY & CONGESTION PO      Take by mouth daily        azithromycin 250 MG tablet    ZITHROMAX    6 tablet    Two tablets first day, then one tablet daily for four days.    Throat pain       nabumetone 750 MG tablet    RELAFEN    30 tablet    Take 1 tablet (750 mg) by mouth 2 times daily as needed for moderate pain    Throat pain

## 2018-03-10 NOTE — PROGRESS NOTES
SUBJECTIVE:   Jacinto Cruz IV is a 32 year old male presenting with a chief complaint of   Chief Complaint   Patient presents with     Urgent Care     URI     Cough, Sore throat, myalgia. Sx x3 weeks       He is an established patient of [facility].    Onset of symptoms was 3 week(s) ago.  Course of illness is fluctuating.    Severity moderate  Current and Associated symptoms: runny nose, stuffy nose, cough - non-productive and body aches  Treatment measures tried include Tylenol/Ibuprofen.  Predisposing factors include None.        Review of Systems   Constitutional: Negative for activity change, appetite change, chills, fatigue and fever.   HENT: Positive for postnasal drip, rhinorrhea, sinus pressure and sore throat.    Eyes: Negative.    Respiratory: Positive for cough and chest tightness.    Cardiovascular: Negative.    Gastrointestinal: Negative.    Endocrine: Negative.    Genitourinary: Negative.    Musculoskeletal: Negative.    Allergic/Immunologic: Negative.    Neurological: Negative.    Hematological: Negative.    Psychiatric/Behavioral: Negative.        Past Medical History:   Diagnosis Date     Gastro-oesophageal reflux disease      Family History   Problem Relation Age of Onset     DIABETES Sister      Current Outpatient Prescriptions   Medication Sig Dispense Refill     Chlorphen-Phenyleph-Ibuprofen (ADVIL ALLERGY & CONGESTION PO) Take by mouth daily       Social History   Substance Use Topics     Smoking status: Former Smoker     Smokeless tobacco: Former User      Comment: quit 5/8/14 at 1230     Alcohol use Yes      Comment: occ       OBJECTIVE  /82 (BP Location: Right arm, Patient Position: Chair, Cuff Size: Adult Regular)  Pulse 84  Temp 98.4  F (36.9  C) (Oral)  SpO2 98%    Physical Exam    Labs:  No results found for this or any previous visit (from the past 24 hour(s)).    X-Ray was not done.    ASSESSMENT:      ICD-10-CM    1. Throat pain R07.0 Rapid strep screen     CBC with  platelets and differential        Medical Decision Making:    Differential Diagnosis:  URI Adult/Peds:  Bronchitis-viral, Croup, Pneumonia, Sinusitis, Viral pharyngitis and Viral upper respiratory illness    Serious Comorbid Conditions:  Adult:  None    PLAN:    URI Adult:  Tylenol and Ibuprofen  1.  We will treat him with azithromycin for a possible secondary infection to a viral infection  2.  Relafen anti-inflammatory twice a day for 10 days  3. Gatorade solution or other sport drink.  For electrolytes    Followup:    If not improving or if condition worsens, follow up with your Primary Care Provider    There are no Patient Instructions on file for this visit.

## 2018-03-13 ASSESSMENT — ENCOUNTER SYMPTOMS
ACTIVITY CHANGE: 0
SORE THROAT: 1
RHINORRHEA: 1
MUSCULOSKELETAL NEGATIVE: 1
ALLERGIC/IMMUNOLOGIC NEGATIVE: 1
FEVER: 0
FATIGUE: 0
PSYCHIATRIC NEGATIVE: 1
CHEST TIGHTNESS: 1
NEUROLOGICAL NEGATIVE: 1
ENDOCRINE NEGATIVE: 1
SINUS PRESSURE: 1
CHILLS: 0
HEMATOLOGIC/LYMPHATIC NEGATIVE: 1
COUGH: 1
GASTROINTESTINAL NEGATIVE: 1
EYES NEGATIVE: 1
CARDIOVASCULAR NEGATIVE: 1
APPETITE CHANGE: 0

## 2018-03-23 ENCOUNTER — RADIANT APPOINTMENT (OUTPATIENT)
Dept: GENERAL RADIOLOGY | Facility: CLINIC | Age: 33
End: 2018-03-23
Attending: PHYSICIAN ASSISTANT
Payer: COMMERCIAL

## 2018-03-23 ENCOUNTER — OFFICE VISIT (OUTPATIENT)
Dept: FAMILY MEDICINE | Facility: CLINIC | Age: 33
End: 2018-03-23
Payer: COMMERCIAL

## 2018-03-23 VITALS
BODY MASS INDEX: 25.93 KG/M2 | RESPIRATION RATE: 18 BRPM | TEMPERATURE: 99.3 F | OXYGEN SATURATION: 97 % | HEIGHT: 70 IN | DIASTOLIC BLOOD PRESSURE: 84 MMHG | HEART RATE: 79 BPM | WEIGHT: 181.1 LBS | SYSTOLIC BLOOD PRESSURE: 142 MMHG

## 2018-03-23 DIAGNOSIS — R60.0 BILATERAL LOWER EXTREMITY EDEMA: ICD-10-CM

## 2018-03-23 DIAGNOSIS — R80.9 PROTEINURIA, UNSPECIFIED TYPE: ICD-10-CM

## 2018-03-23 DIAGNOSIS — R60.0 BILATERAL LOWER EXTREMITY EDEMA: Primary | ICD-10-CM

## 2018-03-23 DIAGNOSIS — R10.84 ABDOMINAL PAIN, GENERALIZED: ICD-10-CM

## 2018-03-23 LAB
ALBUMIN UR-MCNC: >=300 MG/DL
AMORPH CRY #/AREA URNS HPF: ABNORMAL /HPF
APPEARANCE UR: CLEAR
BASOPHILS # BLD AUTO: 0 10E9/L (ref 0–0.2)
BASOPHILS NFR BLD AUTO: 0.3 %
BILIRUB UR QL STRIP: NEGATIVE
COLOR UR AUTO: YELLOW
CRP SERPL-MCNC: <2.9 MG/L (ref 0–8)
DIFFERENTIAL METHOD BLD: ABNORMAL
EOSINOPHIL # BLD AUTO: 0.1 10E9/L (ref 0–0.7)
EOSINOPHIL NFR BLD AUTO: 0.9 %
ERYTHROCYTE [DISTWIDTH] IN BLOOD BY AUTOMATED COUNT: 14.5 % (ref 10–15)
ERYTHROCYTE [SEDIMENTATION RATE] IN BLOOD BY WESTERGREN METHOD: 10 MM/H (ref 0–15)
GLUCOSE UR STRIP-MCNC: NEGATIVE MG/DL
HCT VFR BLD AUTO: 38.6 % (ref 40–53)
HGB BLD-MCNC: 12.2 G/DL (ref 13.3–17.7)
HGB UR QL STRIP: ABNORMAL
HYALINE CASTS #/AREA URNS LPF: ABNORMAL /LPF
KETONES UR STRIP-MCNC: NEGATIVE MG/DL
LEUKOCYTE ESTERASE UR QL STRIP: NEGATIVE
LYMPHOCYTES # BLD AUTO: 1.9 10E9/L (ref 0.8–5.3)
LYMPHOCYTES NFR BLD AUTO: 28.3 %
MCH RBC QN AUTO: 27.6 PG (ref 26.5–33)
MCHC RBC AUTO-ENTMCNC: 31.6 G/DL (ref 31.5–36.5)
MCV RBC AUTO: 87 FL (ref 78–100)
MONOCYTES # BLD AUTO: 0.5 10E9/L (ref 0–1.3)
MONOCYTES NFR BLD AUTO: 7.5 %
MUCOUS THREADS #/AREA URNS LPF: PRESENT /LPF
NEUTROPHILS # BLD AUTO: 4.1 10E9/L (ref 1.6–8.3)
NEUTROPHILS NFR BLD AUTO: 63 %
NITRATE UR QL: NEGATIVE
NON-SQ EPI CELLS #/AREA URNS LPF: ABNORMAL /LPF
PH UR STRIP: 6 PH (ref 5–7)
PLATELET # BLD AUTO: 249 10E9/L (ref 150–450)
RBC # BLD AUTO: 4.42 10E12/L (ref 4.4–5.9)
RBC #/AREA URNS AUTO: ABNORMAL /HPF
SOURCE: ABNORMAL
SP GR UR STRIP: >1.03 (ref 1–1.03)
UROBILINOGEN UR STRIP-ACNC: 0.2 EU/DL (ref 0.2–1)
WBC # BLD AUTO: 6.6 10E9/L (ref 4–11)
WBC #/AREA URNS AUTO: ABNORMAL /HPF

## 2018-03-23 PROCEDURE — 71046 X-RAY EXAM CHEST 2 VIEWS: CPT | Mod: FY

## 2018-03-23 PROCEDURE — 85025 COMPLETE CBC W/AUTO DIFF WBC: CPT | Performed by: PHYSICIAN ASSISTANT

## 2018-03-23 PROCEDURE — 80048 BASIC METABOLIC PNL TOTAL CA: CPT | Performed by: PHYSICIAN ASSISTANT

## 2018-03-23 PROCEDURE — 81001 URINALYSIS AUTO W/SCOPE: CPT | Performed by: PHYSICIAN ASSISTANT

## 2018-03-23 PROCEDURE — 82977 ASSAY OF GGT: CPT | Performed by: PHYSICIAN ASSISTANT

## 2018-03-23 PROCEDURE — 36415 COLL VENOUS BLD VENIPUNCTURE: CPT | Performed by: PHYSICIAN ASSISTANT

## 2018-03-23 PROCEDURE — 99214 OFFICE O/P EST MOD 30 MIN: CPT | Performed by: PHYSICIAN ASSISTANT

## 2018-03-23 PROCEDURE — 80076 HEPATIC FUNCTION PANEL: CPT | Performed by: PHYSICIAN ASSISTANT

## 2018-03-23 PROCEDURE — 85652 RBC SED RATE AUTOMATED: CPT | Performed by: PHYSICIAN ASSISTANT

## 2018-03-23 PROCEDURE — 86038 ANTINUCLEAR ANTIBODIES: CPT | Performed by: PHYSICIAN ASSISTANT

## 2018-03-23 PROCEDURE — 86140 C-REACTIVE PROTEIN: CPT | Performed by: PHYSICIAN ASSISTANT

## 2018-03-23 RX ORDER — FUROSEMIDE 20 MG
20 TABLET ORAL 2 TIMES DAILY
Qty: 10 TABLET | Refills: 0 | Status: SHIPPED | OUTPATIENT
Start: 2018-03-23 | End: 2018-03-27

## 2018-03-23 NOTE — PATIENT INSTRUCTIONS
Please limit your sodium intake and start your diuretic pill. I will be in touch with you regarding the other labs.

## 2018-03-23 NOTE — PROGRESS NOTES
SUBJECTIVE:   Jacinto Cruz IV is a 32 year old male who presents to clinic today for the following health issues:    Musculoskeletal problem/pain    Duration: 4 days     Description  Location: pain in Legs, feet, ankles     Intensity:  moderate    Accompanying signs and symptoms: numbness, tingling and swelling    History  Previous similar problem: no   Previous evaluation:  none    Precipitating or alleviating factors:  Trauma or overuse: no   Aggravating factors include: standing    Therapies tried and outcome: ice, Tylenol, elevation     -Patient presents with a 4 day hx of new bilateral leg symptoms  -First noted that it was painful to stand, walk  -later he felt some swelling of the legs/feet  -there is no chest pain or shortness of breath   -there is no injury/trauma prior to the onset  -has had prior hx of lumbar microdiscetomy  -no current urination change  -he has tried some ice, tylenol and nabumetone, elevation    Problem list and histories reviewed & adjusted, as indicated.  Additional history: as documented    Patient Active Problem List   Diagnosis     Elevated blood pressure reading without diagnosis of hypertension     Episodic tension-type headache, not intractable     Past Surgical History:   Procedure Laterality Date     DISCECTOMY LUMBAR POSTERIOR MICROSCOPIC ONE LEVEL Left 3/16/2015    Procedure: DISCECTOMY LUMBAR POSTERIOR MICROSCOPIC ONE LEVEL;  Surgeon: Gm Griggs MD;  Location: [facility] OR     ORTHOPEDIC SURGERY      left elbow orif       Social History   Substance Use Topics     Smoking status: Former Smoker     Smokeless tobacco: Former User      Comment: quit 5/8/14 at 1230     Alcohol use Yes      Comment: occ     Family History   Problem Relation Age of Onset     DIABETES Sister            Reviewed and updated as needed this visit by clinical staff       Reviewed and updated as needed this visit by Provider         ROS:  Constitutional, HEENT, cardiovascular, pulmonary, gi and gu  "systems are negative, except as otherwise noted.    OBJECTIVE:     /84 (BP Location: Right arm, Patient Position: Chair, Cuff Size: Adult Large)  Pulse 79  Temp 99.3  F (37.4  C) (Tympanic)  Resp 18  Ht 5' 9.75\" (1.772 m)  Wt 181 lb 1.6 oz (82.1 kg)  SpO2 97%  BMI 26.17 kg/m2  Body mass index is 26.17 kg/(m^2).  GENERAL: healthy, alert and no distress  RESP: lungs clear to auscultation - no rales, rhonchi or wheezes  CV: regular rates and rhythm, normal S1 S2, no S3 or S4 and no murmur, click or rub  ABDOMEN: bowel sounds normal and without obvious ascites  MS: there is pitting edema (1+) along bilateral lower legs, feet  SKIN: no suspicious lesions or rashes  NEURO: Normal strength and tone, mentation intact and speech normal    Diagnostic Test Results:  See A/P    ASSESSMENT/PLAN:   1. Bilateral lower extremity edema  2. Abdominal pain, generalized  3. Proteinuria, unspecified type  I am worried about renal complications here ie nephrotic complications with the proteinuria and swelling. The cxr is normal and there is no evidence that this is cardiopulmonary. We'll wait on the remainder of his labs to come back but in the meantime have him restrict his sodium intake and start him on a diuretic. Consider 24 hour urine and nephrology depending.  - Basic metabolic panel  - ESR: Erythrocyte sedimentation rate  - CRP inflammation  - XR Chest 2 Views; Future  - Anti Nuclear Estephania IgG by IFA with Reflex  - *UA reflex to Microscopic and Culture ([place] and [facility] (except [place] and [place])  - furosemide (LASIX) 20 MG tablet; Take 1 tablet (20 mg) by mouth 2 times daily  Dispense: 10 tablet; Refill: 0  - Urine Microscopic  - CBC with platelets differential  - Hepatic panel (Albumin, ALT, AST, Bili, Alk Phos, TP)  - GGT    David Valles PA-C  [facility] [place]  "

## 2018-03-23 NOTE — MR AVS SNAPSHOT
"              After Visit Summary   3/23/2018    Jacinto Cruz IV    MRN: [id number]           Patient Information     Date Of Birth          1985        Visit Information        Provider Department      3/23/2018 3:00 PM David Valles PA-C [facility]        Today's Diagnoses     Bilateral lower extremity edema    -  1    Abdominal pain, generalized        Proteinuria, unspecified type          Care Instructions    Please limit your sodium intake and start your diuretic pill. I will be in touch with you regarding the other labs.           Follow-ups after your visit        Who to contact     If you have questions or need follow up information about today's clinic visit or your schedule please contact [facility] directly at [phone].  Normal or non-critical lab and imaging results will be communicated to you by MyChart, letter or phone within 4 business days after the clinic has received the results. If you do not hear from us within 7 days, please contact the clinic through MyChart or phone. If you have a critical or abnormal lab result, we will notify you by phone as soon as possible.  Submit refill requests through [employer] or call your pharmacy and they will forward the refill request to us. Please allow 3 business days for your refill to be completed.          Additional Information About Your Visit        MyChart Information     [employer] lets you send messages to your doctor, view your test results, renew your prescriptions, schedule appointments and more. To sign up, go to [email or web address] . Click on \"Log in\" on the left side of the screen, which will take you to the Welcome page. Then click on \"Sign up Now\" on the right side of the page.     You will be asked to enter the access code listed below, as well as some personal information. Please follow the directions to create your username and password.     Your access code is: [email or web address]  Expires: " "2018  3:22 PM     Your access code will  in 90 days. If you need help or a new code, please call your [facility] or [phone].        Care EveryWhere ID     This is your Care EveryWhere ID. This could be used by other organizations to access your [place] medical records  [id number]        Your Vitals Were     Pulse Temperature Respirations Height Pulse Oximetry BMI (Body Mass Index)    79 99.3  F (37.4  C) (Tympanic) 18 5' 9.75\" (1.772 m) 97% 26.17 kg/m2       Blood Pressure from Last 3 Encounters:   18 142/84   03/10/18 130/82   17 136/88    Weight from Last 3 Encounters:   18 181 lb 1.6 oz (82.1 kg)   17 167 lb (75.8 kg)   03/16/15 173 lb (78.5 kg)              We Performed the Following     *UA reflex to Microscopic and Culture ([place] and [facility] (except [place] and [place])     Anti Nuclear Estephania IgG by IFA with Reflex     Basic metabolic panel     CBC with platelets differential     CRP inflammation     ESR: Erythrocyte sedimentation rate     GGT     Hepatic panel (Albumin, ALT, AST, Bili, Alk Phos, TP)     Urine Microscopic          Today's Medication Changes          These changes are accurate as of 3/23/18  4:18 PM.  If you have any questions, ask your nurse or doctor.               Start taking these medicines.        Dose/Directions    furosemide 20 MG tablet   Commonly known as:  LASIX   Used for:  Bilateral lower extremity edema   Started by:  David Valles PA-C        Dose:  20 mg   Take 1 tablet (20 mg) by mouth 2 times daily   Quantity:  10 tablet   Refills:  0            Where to get your medicines      These medications were sent to [place] [address] [place] [facility] [id number] [facility] [address]    Hours:  Tech issues with their phone system Phone:  [phone]     furosemide 20 MG tablet                Primary Care Provider Fax #    Physician No Ref-Primary [phone]       No address on file   "      Equal Access to Services     [facility] : Hadii aad ku hadirenestan Kristoferali, wasantiagoda luqadaha, qaybta jeremiejazminshane sage. So [facility] [phone].    ATENCIÓN: Si habla español, tiene a amado disposición servicios gratuitos de asistencia lingüística. Llame al [phone].    We comply with applicable federal civil rights laws and [place] laws. We do not discriminate on the basis of race, color, national origin, age, disability, sex, sexual orientation, or gender identity.            Thank you!     Thank you for choosing [facility] [place]  for your care. Our goal is always to provide you with excellent care. Hearing back from our patients is one way we can continue to improve our services. Please take a few minutes to complete the written survey that you may receive in the mail after your visit with us. Thank you!             Your Updated Medication List - Protect others around you: Learn how to safely use, store and throw away your medicines at www.disposemymeds.org.          This list is accurate as of 3/23/18  4:18 PM.  Always use your most recent med list.                   Brand Name Dispense Instructions for use Diagnosis    ADVIL ALLERGY & CONGESTION PO      Take by mouth daily        furosemide 20 MG tablet    LASIX    10 tablet    Take 1 tablet (20 mg) by mouth 2 times daily    Bilateral lower extremity edema       nabumetone 750 MG tablet    RELAFEN    30 tablet    Take 1 tablet (750 mg) by mouth 2 times daily as needed for moderate pain    Throat pain

## 2018-03-24 LAB
ALBUMIN SERPL-MCNC: 2.9 G/DL (ref 3.4–5)
ALP SERPL-CCNC: 81 U/L (ref 40–150)
ALT SERPL W P-5'-P-CCNC: 28 U/L (ref 0–70)
ANION GAP SERPL CALCULATED.3IONS-SCNC: 6 MMOL/L (ref 3–14)
AST SERPL W P-5'-P-CCNC: 25 U/L (ref 0–45)
BILIRUB DIRECT SERPL-MCNC: 0.1 MG/DL (ref 0–0.2)
BILIRUB SERPL-MCNC: 0.3 MG/DL (ref 0.2–1.3)
BUN SERPL-MCNC: 14 MG/DL (ref 7–30)
CALCIUM SERPL-MCNC: 8.3 MG/DL (ref 8.5–10.1)
CHLORIDE SERPL-SCNC: 110 MMOL/L (ref 94–109)
CO2 SERPL-SCNC: 29 MMOL/L (ref 20–32)
CREAT SERPL-MCNC: 0.92 MG/DL (ref 0.66–1.25)
GFR SERPL CREATININE-BSD FRML MDRD: >90 ML/MIN/1.7M2
GGT SERPL-CCNC: 8 U/L (ref 0–75)
GLUCOSE SERPL-MCNC: 84 MG/DL (ref 70–99)
POTASSIUM SERPL-SCNC: 4.1 MMOL/L (ref 3.4–5.3)
PROT SERPL-MCNC: 6 G/DL (ref 6.8–8.8)
SODIUM SERPL-SCNC: 145 MMOL/L (ref 133–144)

## 2018-03-26 ENCOUNTER — TELEPHONE (OUTPATIENT)
Dept: FAMILY MEDICINE | Facility: CLINIC | Age: 33
End: 2018-03-26

## 2018-03-26 DIAGNOSIS — R60.0 BILATERAL LOWER EXTREMITY EDEMA: Primary | ICD-10-CM

## 2018-03-26 DIAGNOSIS — R80.9 PROTEINURIA, UNSPECIFIED TYPE: ICD-10-CM

## 2018-03-26 LAB — ANA SER QL IF: NEGATIVE

## 2018-03-26 NOTE — TELEPHONE ENCOUNTER
Patient called and is wondering if he is able to return to work. He is asking for a work excuse note if he is not able to return.     Please call patient to let him know if he can return to work. [phone] (home)     Thanks.   -Arianna Peña

## 2018-03-26 NOTE — LETTER
[facility]  [address] [facility] [address]  Phone: [phone]    March 27, 2018        Jacinto Cruz IV  [address] [place] [id number]          To whom it may concern:    RE: Jacinto Cruz IV    Patient was seen and treated at our clinic, Friday 3/23/18.  While he is undergoing medical therapy and further work up, please allow him access to a chair or other methods that will help him remain off of his feet periodcially to help limit his symptoms.     Please contact me for questions or concerns.      Sincerely,        David Valles PA-C

## 2018-03-26 NOTE — TELEPHONE ENCOUNTER
Patient is calling about lab results from 3/23. Please call patient to discuss labs.  [phone] (home)     Thank you.     -Arianna Peña

## 2018-03-26 NOTE — TELEPHONE ENCOUNTER
Called the pt back.  He has pain in his feet and in his legs and now in hands, wrists, head and abdominal region.  He says he is all swollen.  He is wondering if he can go to work.  He will need a note for tomorrow if not.  The swelling is painful.      Will forward to Manuel Valles for advisal   Please advise on his lab work also.

## 2018-03-27 RX ORDER — FUROSEMIDE 20 MG
20 TABLET ORAL 2 TIMES DAILY
Qty: 14 TABLET | Refills: 0 | Status: SHIPPED | OUTPATIENT
Start: 2018-03-27 | End: 2018-11-12

## 2018-03-27 NOTE — TELEPHONE ENCOUNTER
I have put in a call to nephrology. Hopefully to see him more acutely. We'll plan to continue his diuretic until then. I did call and leave a VM to let him know the plan.

## 2018-03-27 NOTE — TELEPHONE ENCOUNTER
Patient called back to discuss update. Swelling has improved some on the diuretic. We'll continue this. He is aware to be watching for a phone call from nephrology. I will fax a work note.

## 2018-03-29 ENCOUNTER — TRANSFERRED RECORDS (OUTPATIENT)
Dept: HEALTH INFORMATION MANAGEMENT | Facility: CLINIC | Age: 33
End: 2018-03-29

## 2018-04-04 ENCOUNTER — HOSPITAL ENCOUNTER (OUTPATIENT)
Dept: ULTRASOUND IMAGING | Facility: CLINIC | Age: 33
Discharge: HOME | End: 2018-04-04
Attending: INTERNAL MEDICINE | Admitting: INTERNAL MEDICINE
Payer: COMMERCIAL

## 2018-04-04 DIAGNOSIS — N04.9 NEPHROTIC SYNDROME: ICD-10-CM

## 2018-04-04 DIAGNOSIS — R80.9 PROTEINURIA: ICD-10-CM

## 2018-04-04 PROCEDURE — 76770 US EXAM ABDO BACK WALL COMP: CPT

## 2018-04-09 DIAGNOSIS — E88.09 PROTEIN, SERUM, DECREASED LEVEL: ICD-10-CM

## 2018-04-09 DIAGNOSIS — R80.9 PROTEINURIA: Primary | ICD-10-CM

## 2018-04-09 DIAGNOSIS — N04.9 NEPHROSIS: ICD-10-CM

## 2018-04-12 ENCOUNTER — HOSPITAL ENCOUNTER (OUTPATIENT)
Facility: CLINIC | Age: 33
Discharge: HOME | End: 2018-04-12
Attending: INTERNAL MEDICINE | Admitting: INTERNAL MEDICINE
Payer: COMMERCIAL

## 2018-04-12 ENCOUNTER — TRANSFERRED RECORDS (OUTPATIENT)
Dept: HEALTH INFORMATION MANAGEMENT | Facility: CLINIC | Age: 33
End: 2018-04-12

## 2018-04-12 ENCOUNTER — HOSPITAL ENCOUNTER (OUTPATIENT)
Dept: ULTRASOUND IMAGING | Facility: CLINIC | Age: 33
End: 2018-04-12
Attending: INTERNAL MEDICINE | Admitting: INTERNAL MEDICINE
Payer: COMMERCIAL

## 2018-04-12 VITALS
DIASTOLIC BLOOD PRESSURE: 76 MMHG | BODY MASS INDEX: 25.2 KG/M2 | SYSTOLIC BLOOD PRESSURE: 114 MMHG | OXYGEN SATURATION: 96 % | HEIGHT: 71 IN | TEMPERATURE: 97.8 F | WEIGHT: 180 LBS | HEART RATE: 63 BPM | RESPIRATION RATE: 16 BRPM

## 2018-04-12 DIAGNOSIS — N04.9 NEPHROTIC SYNDROME: ICD-10-CM

## 2018-04-12 DIAGNOSIS — R80.9 PROTEINURIA: ICD-10-CM

## 2018-04-12 LAB
CLOSURE TME COLL+EPINEP BLD: 121 SEC
ERYTHROCYTE [DISTWIDTH] IN BLOOD BY AUTOMATED COUNT: 14.3 % (ref 10–15)
HCT VFR BLD AUTO: 43.1 % (ref 40–53)
HGB BLD-MCNC: 14.2 G/DL (ref 13.3–17.7)
INR PPP: 0.93 (ref 0.86–1.14)
MCH RBC QN AUTO: 27.7 PG (ref 26.5–33)
MCHC RBC AUTO-ENTMCNC: 32.9 G/DL (ref 31.5–36.5)
MCV RBC AUTO: 84 FL (ref 78–100)
PLATELET # BLD AUTO: 227 10E9/L (ref 150–450)
RBC # BLD AUTO: 5.13 10E12/L (ref 4.4–5.9)
WBC # BLD AUTO: 6.9 10E9/L (ref 4–11)

## 2018-04-12 PROCEDURE — 88313 SPECIAL STAINS GROUP 2: CPT | Performed by: INTERNAL MEDICINE

## 2018-04-12 PROCEDURE — 00000159 ZZHCL STATISTIC H-SEND OUTS PREP: Performed by: INTERNAL MEDICINE

## 2018-04-12 PROCEDURE — 50200 RENAL BIOPSY PERQ: CPT

## 2018-04-12 PROCEDURE — 88305 TISSUE EXAM BY PATHOLOGIST: CPT | Performed by: INTERNAL MEDICINE

## 2018-04-12 PROCEDURE — 85610 PROTHROMBIN TIME: CPT | Performed by: INTERNAL MEDICINE

## 2018-04-12 PROCEDURE — 85027 COMPLETE CBC AUTOMATED: CPT | Performed by: INTERNAL MEDICINE

## 2018-04-12 PROCEDURE — 40000863 ZZH STATISTIC RADIOLOGY XRAY, US, CT, MAR, NM

## 2018-04-12 PROCEDURE — 88350 IMFLUOR EA ADDL 1ANTB STN PX: CPT | Performed by: INTERNAL MEDICINE

## 2018-04-12 PROCEDURE — 85576 BLOOD PLATELET AGGREGATION: CPT | Performed by: INTERNAL MEDICINE

## 2018-04-12 PROCEDURE — 36591 DRAW BLOOD OFF VENOUS DEVICE: CPT

## 2018-04-12 PROCEDURE — 25000132 ZZH RX MED GY IP 250 OP 250 PS 637: Performed by: INTERNAL MEDICINE

## 2018-04-12 PROCEDURE — 25000125 ZZHC RX 250: Performed by: INTERNAL MEDICINE

## 2018-04-12 PROCEDURE — 88346 IMFLUOR 1ST 1ANTB STAIN PX: CPT | Performed by: INTERNAL MEDICINE

## 2018-04-12 PROCEDURE — 88348 ELECTRON MICROSCOPY DX: CPT | Performed by: INTERNAL MEDICINE

## 2018-04-12 RX ORDER — ACETAMINOPHEN 325 MG/1
650 TABLET ORAL EVERY 4 HOURS PRN
Status: DISCONTINUED | OUTPATIENT
Start: 2018-04-12 | End: 2018-04-12 | Stop reason: HOSPADM

## 2018-04-12 RX ORDER — CLONIDINE HYDROCHLORIDE 0.1 MG/1
0.1 TABLET ORAL
Status: COMPLETED | OUTPATIENT
Start: 2018-04-12 | End: 2018-04-12

## 2018-04-12 RX ORDER — LIDOCAINE HYDROCHLORIDE 10 MG/ML
20 INJECTION, SOLUTION EPIDURAL; INFILTRATION; INTRACAUDAL; PERINEURAL ONCE
Status: COMPLETED | OUTPATIENT
Start: 2018-04-12 | End: 2018-04-12

## 2018-04-12 RX ORDER — CLONIDINE HYDROCHLORIDE 0.1 MG/1
0.1 TABLET ORAL
Status: DISCONTINUED | OUTPATIENT
Start: 2018-04-12 | End: 2018-04-12 | Stop reason: HOSPADM

## 2018-04-12 RX ORDER — NALOXONE HYDROCHLORIDE 0.4 MG/ML
.1-.4 INJECTION, SOLUTION INTRAMUSCULAR; INTRAVENOUS; SUBCUTANEOUS
Status: DISCONTINUED | OUTPATIENT
Start: 2018-04-12 | End: 2018-04-12 | Stop reason: HOSPADM

## 2018-04-12 RX ORDER — DIAZEPAM 2 MG
2 TABLET ORAL
Status: COMPLETED | OUTPATIENT
Start: 2018-04-12 | End: 2018-04-12

## 2018-04-12 RX ORDER — LIDOCAINE 40 MG/G
CREAM TOPICAL
Status: DISCONTINUED | OUTPATIENT
Start: 2018-04-12 | End: 2018-04-12 | Stop reason: HOSPADM

## 2018-04-12 RX ADMIN — LIDOCAINE HYDROCHLORIDE 20 ML: 10 INJECTION, SOLUTION INFILTRATION; PERINEURAL at 09:15

## 2018-04-12 RX ADMIN — DIAZEPAM 2 MG: 2 TABLET ORAL at 08:52

## 2018-04-12 RX ADMIN — CLONIDINE HYDROCHLORIDE 0.1 MG: 0.1 TABLET ORAL at 08:14

## 2018-04-12 NOTE — PROGRESS NOTES
0909 - Pt returned from ultrasound, site clean, dry, and intact. Pt reporting some soreness at site. Tolerating fluids. Bedrest with bathroom privilege, MD paged regarding length of bedrest. VS stable on room air. Pt resting comfortably, pt's mother at the bedside.    1046 - pt voided 300, clear yellow urine.    9877 - Dr. Jean paged regarding length of bedrest and approximate time for discharge.

## 2018-04-12 NOTE — PROGRESS NOTES
Pt looks good.  He has some mild tenderness to palpation at L flank bx site.  No hematoma.  Ok to go home.  Instructions to call us if he develops fevers, bloody urine, or severe pain.

## 2018-04-12 NOTE — IP AVS SNAPSHOT
[place] [address]    [place] [address]    Phone:  [phone]                                       After Visit Summary   4/12/2018    Jacinto Cruz IV    MRN: [id number]           After Visit Summary Signature Page     I have received my discharge instructions, and my questions have been answered. I have discussed any challenges I see with this plan with the nurse or doctor.    ..........................................................................................................................................  Patient/Patient Representative Signature      ..........................................................................................................................................  Patient Representative Print Name and Relationship to Patient    ..................................................               ................................................  Date                                            Time    ..........................................................................................................................................  Reviewed by Signature/Title    ...................................................              ..............................................  Date                                                            Time

## 2018-04-12 NOTE — PROGRESS NOTES
AVS printed and given to patient. Discharge instructions reviewed with patient. All questions answered, patient verbalized understanding. Off bedrest at 1540, plan to discharge around 1545.

## 2018-04-12 NOTE — PROGRESS NOTES
Admission profile complete. IV inserted. Dr. Jean notified that patient needs to be consented prior to valium being given. Catapres given x1 for elevated BP.

## 2018-04-12 NOTE — PROGRESS NOTES
1450 Report received from LELE Jacome.  1510 OOB - steady on feet. Ambulated in halls to bathroom with good nisha. Voiding clear yellow urine. Bandaid CDI to left back puncture site. Pt's mom at bedside.  1540 Dr Jean at bedside. Pt okay to be discharged.  1555 Pt discharged per w/c to private vehicle. All personal belongings taken w/ pt.

## 2018-04-12 NOTE — IP AVS SNAPSHOT
MRN:[id number]                      After Visit Summary   4/12/2018    Jacinto Cruz IV    MRN: [id number]           Visit Information        Department      4/12/2018  7:24 AM [facility]          Review of your medicines      UNREVIEWED medicines. Ask your doctor about these medicines        Dose / Directions    ADVIL ALLERGY & CONGESTION PO        Take by mouth daily   Refills:  0       furosemide 20 MG tablet   Commonly known as:  LASIX   Used for:  Bilateral lower extremity edema        Dose:  20 mg   Take 1 tablet (20 mg) by mouth 2 times daily   Quantity:  14 tablet   Refills:  0       nabumetone 750 MG tablet   Commonly known as:  RELAFEN   Used for:  Throat pain        Dose:  750 mg   Take 1 tablet (750 mg) by mouth 2 times daily as needed for moderate pain   Quantity:  30 tablet   Refills:  1                Protect others around you: Learn how to safely use, store and throw away your medicines at www.disposemymeds.org.         Follow-ups after your visit         Care Instructions        Further instructions from your care team       Renal Biopsy Discharge Instructions - Nephrology    After you go home:      You may resume your normal diet    Have an adult stay with you for 6 hours if you received oral or IV sedation       For 24 hours - due to the sedation you received:    Relax and take it easy    Do NOT make any important or legal decisions    Do NOT drive or operate machines at home or at work    Do NOT drink alcohol    Care of Puncture Site:      For the first 48 hrs, check your puncture site every couple hours while you are awake     You may remove/change the bandaid tomorrow    You may shower tomorrow    No tub baths, whirlpools or swimming until your puncture site has fully healed    Activity       You may go back to normal activity in 24 hours     Do NOT do any heavy activity such as exercise, lifting, or straining for 48 hours.     No housework, yard work or  "any activity that make you sweat    Do NOT lift more than 15-20 pounds    Medicines:      You may resume all medications    Resume your Warfarin/Coumadin at your regular dose today. Follow up with your provider to have your INR rechecked    Resume your Platelet Inhibitors and Aspirin tomorrow at your regular dose.    For minor pain, you may take Acetaminophen (Tylenol).    Do NOT take Ibuprofen, Aleve or NSAIDS            Call the clinic/provider who ordered this test if:      Chills or a fever greater than 101 F (38 C)    Increased pain on the biopsy side    Visible blood in the urine    A large or growing hard lump around the site    The site is red, swollen, hot or tender    Pain that is getting worse    Any questions or concerns    Call  911 or go to the Emergency Room if:      Severe pain or trouble breathing    Bleeding that you cannot control        If you have questions call:          [facility] @ [phone] ... (24/7)      The provider who performed your procedure was Dr. Jean.            .       Additional Information About Your Visit        [facility] Information     [employer] gives you secure access to your electronic health record. If you see a primary care provider, you can also send messages to your care team and make appointments. If you have questions, please call your primary care clinic.  If you do not have a primary care provider, please call [phone] and they will assist you.        Care EveryWhere ID     This is your Care EveryWhere ID. This could be used by other organizations to access your [place] medical records  [id number]        Your Vitals Were     Blood Pressure Pulse Temperature Respirations Height Weight    109/73 63 97.8  F (36.6  C) (Oral) 16 1.791 m (5' 10.5\") 81.6 kg (180 lb)    Pulse Oximetry BMI (Body Mass Index)                96% 25.46 kg/m2           Primary Care Provider Fax #    Physician No Ref-Primary [phone]      Equal Access to Services     JONATHAN AGUIRRE " AH: Ish Khanna, waaxda luqadaha, qaybta kasuellen sandrayvette, shane landaverdehelenrusty joseph. So [facility] [phone].    ATENCIÓN: Si habla español, tiene a amado disposición servicios gratuitos de asistencia lingüística. Llame al [phone].    We comply with applicable federal civil rights laws and [place] laws. We do not discriminate on the basis of race, color, national origin, age, disability, sex, sexual orientation, or gender identity.            Thank you!     Thank you for choosing [place] for your care. Our goal is always to provide you with excellent care. Hearing back from our patients is one way we can continue to improve our services. Please take a few minutes to complete the written survey that you may receive in the mail after you visit with us. Thank you!             Medication List: This is a list of all your medications and when to take them. Check marks below indicate your daily home schedule. Keep this list as a reference.      Medications           Morning Afternoon Evening Bedtime As Needed    ADVIL ALLERGY & CONGESTION PO   Take by mouth daily                                furosemide 20 MG tablet   Commonly known as:  LASIX   Take 1 tablet (20 mg) by mouth 2 times daily                                nabumetone 750 MG tablet   Commonly known as:  RELAFEN   Take 1 tablet (750 mg) by mouth 2 times daily as needed for moderate pain

## 2018-04-12 NOTE — PROGRESS NOTES
Call returned from Dr Jean -- ordered 6 hours bedrest post procedure and then able to D/C to home.

## 2018-04-12 NOTE — DISCHARGE INSTRUCTIONS
Renal Biopsy Discharge Instructions - Nephrology    After you go home:      You may resume your normal diet    Have an adult stay with you for 6 hours if you received oral or IV sedation       For 24 hours - due to the sedation you received:    Relax and take it easy    Do NOT make any important or legal decisions    Do NOT drive or operate machines at home or at work    Do NOT drink alcohol    Care of Puncture Site:      For the first 48 hrs, check your puncture site every couple hours while you are awake     You may remove/change the bandaid tomorrow    You may shower tomorrow    No tub baths, whirlpools or swimming until your puncture site has fully healed    Activity       You may go back to normal activity in 24 hours     Do NOT do any heavy activity such as exercise, lifting, or straining for 48 hours.     No housework, yard work or any activity that make you sweat    Do NOT lift more than 15-20 pounds    Medicines:      You may resume all medications    Resume your Warfarin/Coumadin at your regular dose today. Follow up with your provider to have your INR rechecked    Resume your Platelet Inhibitors and Aspirin tomorrow at your regular dose.    For minor pain, you may take Acetaminophen (Tylenol).    Do NOT take Ibuprofen, Aleve or NSAIDS            Call the clinic/provider who ordered this test if:      Chills or a fever greater than 101 F (38 C)    Increased pain on the biopsy side    Visible blood in the urine    A large or growing hard lump around the site    The site is red, swollen, hot or tender    Pain that is getting worse    Any questions or concerns    Call  911 or go to the Emergency Room if:      Severe pain or trouble breathing    Bleeding that you cannot control        If you have questions call:          [facility] @ [phone] ... (24/7)      The provider who performed your procedure was Dr. Jean.            .

## 2018-04-13 LAB — COPATH REPORT: NORMAL

## 2018-04-15 ENCOUNTER — HOSPITAL ENCOUNTER (OUTPATIENT)
Dept: LAB | Facility: CLINIC | Age: 33
End: 2018-04-15

## 2018-04-15 ENCOUNTER — HOSPITAL ENCOUNTER (OUTPATIENT)
Dept: LAB | Facility: CLINIC | Age: 33
Discharge: HOME | End: 2018-04-15
Admitting: INTERNAL MEDICINE
Payer: COMMERCIAL

## 2018-04-15 DIAGNOSIS — R80.9 PROTEINURIA: ICD-10-CM

## 2018-04-15 DIAGNOSIS — E88.09 PROTEIN, SERUM, DECREASED LEVEL: ICD-10-CM

## 2018-04-15 DIAGNOSIS — N04.9 NEPHROSIS: ICD-10-CM

## 2018-04-15 PROCEDURE — 81050 URINALYSIS VOLUME MEASURE: CPT | Performed by: INTERNAL MEDICINE

## 2018-04-15 PROCEDURE — 84156 ASSAY OF PROTEIN URINE: CPT | Performed by: INTERNAL MEDICINE

## 2018-04-16 LAB
COLLECT DURATION TIME UR: 24 H
CREAT 24H UR-MRATE: 1.75 G/(24.H) (ref 1–2)
CREAT UR-MCNC: 125 MG/DL
PROT 24H UR-MRATE: 0.77 G/(24.H) (ref 0.04–0.23)
PROT UR-MCNC: 0.55 G/L
PROT/CREAT 24H UR: 0.44 G/G CR (ref 0–0.2)
SPECIMEN VOL UR: 1400 ML

## 2018-04-25 ENCOUNTER — TRANSFERRED RECORDS (OUTPATIENT)
Dept: HEALTH INFORMATION MANAGEMENT | Facility: CLINIC | Age: 33
End: 2018-04-25

## 2018-05-02 ENCOUNTER — MEDICAL CORRESPONDENCE (OUTPATIENT)
Dept: HEALTH INFORMATION MANAGEMENT | Facility: CLINIC | Age: 33
End: 2018-05-02

## 2018-05-11 DIAGNOSIS — N05.0 NEPHRITIC SYNDROME WITH MINOR GLOMERULAR ABNORMALITY: ICD-10-CM

## 2018-05-11 DIAGNOSIS — N04.9 CONGENITAL NEPHROTIC SYNDROME: ICD-10-CM

## 2018-05-11 DIAGNOSIS — R80.9 PROTEINURIA: ICD-10-CM

## 2018-05-11 LAB
ALBUMIN UR-MCNC: NEGATIVE MG/DL
APPEARANCE UR: CLEAR
BASOPHILS # BLD AUTO: 0.1 10E9/L (ref 0–0.2)
BASOPHILS NFR BLD AUTO: 0.7 %
BILIRUB UR QL STRIP: NEGATIVE
COLOR UR AUTO: YELLOW
CREAT UR-MCNC: 42 MG/DL
DIFFERENTIAL METHOD BLD: NORMAL
EOSINOPHIL # BLD AUTO: 0.1 10E9/L (ref 0–0.7)
EOSINOPHIL NFR BLD AUTO: 1.1 %
ERYTHROCYTE [DISTWIDTH] IN BLOOD BY AUTOMATED COUNT: 14.9 % (ref 10–15)
GLUCOSE UR STRIP-MCNC: NEGATIVE MG/DL
HCT VFR BLD AUTO: 43.8 % (ref 40–53)
HGB BLD-MCNC: 13.9 G/DL (ref 13.3–17.7)
HGB UR QL STRIP: NEGATIVE
KETONES UR STRIP-MCNC: NEGATIVE MG/DL
LEUKOCYTE ESTERASE UR QL STRIP: NEGATIVE
LYMPHOCYTES # BLD AUTO: 2.5 10E9/L (ref 0.8–5.3)
LYMPHOCYTES NFR BLD AUTO: 35.1 %
MCH RBC QN AUTO: 27.4 PG (ref 26.5–33)
MCHC RBC AUTO-ENTMCNC: 31.7 G/DL (ref 31.5–36.5)
MCV RBC AUTO: 86 FL (ref 78–100)
MONOCYTES # BLD AUTO: 0.5 10E9/L (ref 0–1.3)
MONOCYTES NFR BLD AUTO: 7 %
NEUTROPHILS # BLD AUTO: 4 10E9/L (ref 1.6–8.3)
NEUTROPHILS NFR BLD AUTO: 56.1 %
NITRATE UR QL: NEGATIVE
PH UR STRIP: 5.5 PH (ref 5–7)
PLATELET # BLD AUTO: 245 10E9/L (ref 150–450)
PROT UR-MCNC: <0.05 G/L
PROT/CREAT 24H UR: NORMAL G/G CR (ref 0–0.2)
RBC # BLD AUTO: 5.07 10E12/L (ref 4.4–5.9)
SOURCE: NORMAL
SP GR UR STRIP: 1.01 (ref 1–1.03)
UROBILINOGEN UR STRIP-ACNC: 0.2 EU/DL (ref 0.2–1)
WBC # BLD AUTO: 7.2 10E9/L (ref 4–11)

## 2018-05-11 PROCEDURE — 84156 ASSAY OF PROTEIN URINE: CPT | Performed by: FAMILY MEDICINE

## 2018-05-11 PROCEDURE — 85025 COMPLETE CBC W/AUTO DIFF WBC: CPT | Performed by: FAMILY MEDICINE

## 2018-05-11 PROCEDURE — 80069 RENAL FUNCTION PANEL: CPT | Performed by: FAMILY MEDICINE

## 2018-05-11 PROCEDURE — 81003 URINALYSIS AUTO W/O SCOPE: CPT | Performed by: FAMILY MEDICINE

## 2018-05-11 PROCEDURE — 36415 COLL VENOUS BLD VENIPUNCTURE: CPT | Performed by: FAMILY MEDICINE

## 2018-05-12 LAB
ALBUMIN SERPL-MCNC: 4.2 G/DL (ref 3.4–5)
ANION GAP SERPL CALCULATED.3IONS-SCNC: 7 MMOL/L (ref 3–14)
BUN SERPL-MCNC: 16 MG/DL (ref 7–30)
CALCIUM SERPL-MCNC: 9.3 MG/DL (ref 8.5–10.1)
CHLORIDE SERPL-SCNC: 108 MMOL/L (ref 94–109)
CO2 SERPL-SCNC: 27 MMOL/L (ref 20–32)
CREAT SERPL-MCNC: 0.94 MG/DL (ref 0.66–1.25)
GFR SERPL CREATININE-BSD FRML MDRD: >90 ML/MIN/1.7M2
GLUCOSE SERPL-MCNC: 84 MG/DL (ref 70–99)
PHOSPHATE SERPL-MCNC: 2.6 MG/DL (ref 2.5–4.5)
POTASSIUM SERPL-SCNC: 4.3 MMOL/L (ref 3.4–5.3)
SODIUM SERPL-SCNC: 142 MMOL/L (ref 133–144)

## 2018-07-06 ENCOUNTER — TRANSFERRED RECORDS (OUTPATIENT)
Dept: HEALTH INFORMATION MANAGEMENT | Facility: CLINIC | Age: 33
End: 2018-07-06

## 2018-08-09 NOTE — PROGRESS NOTES
PROCEDURE NOTE - RENAL BIOPSY    Procedure:  L native renal biopsy  Indication:  Nephrotic syndrome  Consent:  Obtained  Anesthesia:  1% lidocaine.  Narrative:  Using real-time u/s guidance, pt's kidney was visualized.  4 passes were made into the lower pole c core tissue obtained.  Specimens will be sent to [facility] for evaluation under LM, IF, EM.    Complications:  No hematoma noted on post-procedure u/s.  Disposition:  To care suites for observation.       radha twice a day  Get neoprene knee brace  Contact office in one week

## 2018-10-12 ENCOUNTER — OFFICE VISIT (OUTPATIENT)
Dept: URGENT CARE | Facility: URGENT CARE | Age: 33
End: 2018-10-12
Payer: COMMERCIAL

## 2018-10-12 VITALS
DIASTOLIC BLOOD PRESSURE: 78 MMHG | BODY MASS INDEX: 25.46 KG/M2 | WEIGHT: 180 LBS | HEART RATE: 77 BPM | OXYGEN SATURATION: 98 % | TEMPERATURE: 98 F | SYSTOLIC BLOOD PRESSURE: 116 MMHG

## 2018-10-12 DIAGNOSIS — H65.92 OME (OTITIS MEDIA WITH EFFUSION), LEFT: Primary | ICD-10-CM

## 2018-10-12 PROCEDURE — 99213 OFFICE O/P EST LOW 20 MIN: CPT | Performed by: PHYSICIAN ASSISTANT

## 2018-10-12 RX ORDER — AMOXICILLIN 500 MG/1
500 CAPSULE ORAL 3 TIMES DAILY
Qty: 30 CAPSULE | Refills: 0 | Status: SHIPPED | OUTPATIENT
Start: 2018-10-12 | End: 2018-11-12

## 2018-10-12 NOTE — MR AVS SNAPSHOT
After Visit Summary   10/12/2018    Jacinto Cruz IV    MRN: [id number]           Patient Information     Date Of Birth          1985        Visit Information        Provider Department      10/12/2018 1:15 PM Rafael Smith PA-C [facility]        Today's Diagnoses     OME (otitis media with effusion), left    -  1      Care Instructions          With distilled water 2-3x per day for a minimum 2-3 days    Otitis Media (Middle-Ear Infection) in Adults  Otitis media is another name for a middle-ear infection. It means an infection behind your eardrum. This kind of ear infection can happen after any condition that keeps fluid from draining from the middle ear. These conditions include allergies, a cold, a sore throat, or a respiratory infection.  Middle-ear infections are common in children, but they can also happen in adults. An ear infection in an adult may mean a more serious problem than in a child. So you may need additional tests. If you have an ear infection, you should see your health care provider for treatment.  What are the types of middle-ear infections?  Infections can affect the middle ear in several ways. They are:    Acute otitis media. This middle-ear infection occurs suddenly. It causes swelling and redness. Fluid and mucus become trapped inside the ear. You can have a fever and ear pain.    Otitis media with effusion. Fluid (effusion) and mucus build up in the middle ear after the infection goes away. You may feel like your middle ear is full. This can continue for months and may affect your hearing.    Chronic otitis media with effusion. Fluid (effusion) remains in the middle ear for a long time. Or it builds up again and again, even though there is no infection. This type of middle-ear infection may be hard to treat. It may also affect your hearing.  Who is more likely to get a middle-ear infection?  You are more likely to get an ear infection if  you:    Smoke or are around someone who smokes    Have seasonal or year-round allergy symptoms    Have a cold or other upper respiratory infection  What causes a middle-ear infection?  The middle ear connects to the throat by a canal called the eustachian tube. This tube helps even out the pressure between the outer ear and the inner ear. A cold or allergy can irritate the tube or cause the area around it to swell. This can keep fluid from draining from the middle ear. The fluid builds up behind the eardrum. Bacteria and viruses can grow in this fluid. The bacteria and viruses cause the middle-ear infection.  What are the symptoms of a middle-ear infection?  Common symptoms of a middle-ear infection in adults are:    Pain in 1 or both ears    Drainage from the ear    Muffled hearing    Sore throat   You may also have a fever. Rarely, your balance can be affected.  These symptoms may be the same as for other conditions. It s important to talk with your health care provider if you think you have a middle-ear infection. If you have a high fever, severe pain behind your ear, or paralysis in your face, see your provider as soon as you can.  How is a middle-ear infection diagnosed?  Your health care provider will take a medical history and do a physical exam. He or she will look at the outer ear and eardrum with an otoscope. The otoscope is a lighted tool that lets your provider see inside the ear. A pneumatic otoscope blows a puff of air into the ear to check how well your eardrum moves. If you eardrum doesn t move well, it may mean you have fluid behind it.  Your provider may also do a test called tympanometry. This test tells how well the middle ear is working. It can find any changes in pressure in the middle ear. Your provider may test your hearing with a tuning fork.  How is a middle-ear infection treated?  A middle-ear infection may be treated with:    Antibiotics, taken by mouth or as ear drops    Medication for  pain    Decongestants, antihistamines, or nasal steroids  Your health care provider may also have you try autoinsufflation. This helps adjust the air pressure in your ear. For this, you pinch your nose and gently exhale. This forces air back through the eustachian tube.  The exact treatment for your ear infection will depend on the type of infection you have. In general, if your symptoms don t get better in 48 to 72 hours, contact your health care provider.  Middle-ear infections can cause long-term problems if not treated. They can lead to:    Infection in other parts of the head    Permanent hearing loss    Paralysis of a nerve in your face  If you have a middle-ear infection that doesn t get better, you may need to see an ear, nose, and throat specialist (otolaryngologist). You may need a CT scan or MRI to check for head and neck cancer.  Ear tubes  Sometimes fluid stays in the middle ear even after you take antibiotics and the infection goes away. In this case, your health care provider may suggest that a small tube be placed in your ear. The tube is put at the opening of the eardrum. The tube keeps fluid from building up and relieves pressure in the middle ear. It can also help you hear better. This surgery is called myringotomy. It is not often done in adults.  The tubes usually fall out on their own after 6 months to a year.    1647-7939 [employer]. [address] [place], [place] [address]. All rights reserved. This information is not intended as a substitute for professional medical care. Always follow your healthcare professional's instructions.                Follow-ups after your visit        Who to contact     If you have questions or need follow up information about today's clinic visit or your schedule please contact [facility] directly at [phone].  Normal or non-critical lab and imaging results will be communicated to you by MyChart, letter or phone within 4  business days after the clinic has received the results. If you do not hear from us within 7 days, please contact the clinic through [employer] or phone. If you have a critical or abnormal lab result, we will notify you by phone as soon as possible.  Submit refill requests through [employer] or call your pharmacy and they will forward the refill request to us. Please allow 3 business days for your refill to be completed.          Additional Information About Your Visit        [employer] Information     [employer] gives you secure access to your electronic health record. If you see a primary care provider, you can also send messages to your care team and make appointments. If you have questions, please call your primary care clinic.  If you do not have a primary care provider, please call [phone] and they will assist you.        Care EveryWhere ID     This is your Care EveryWhere ID. This could be used by other organizations to access your [place] medical records  [id number]        Your Vitals Were     Pulse Temperature Pulse Oximetry BMI (Body Mass Index)          77 98  F (36.7  C) (Oral) 98% 25.46 kg/m2         Blood Pressure from Last 3 Encounters:   10/12/18 116/78   04/12/18 114/76   03/23/18 142/84    Weight from Last 3 Encounters:   10/12/18 180 lb (81.6 kg)   04/12/18 180 lb (81.6 kg)   03/23/18 181 lb 1.6 oz (82.1 kg)              Today, you had the following     No orders found for display         Today's Medication Changes          These changes are accurate as of 10/12/18  1:46 PM.  If you have any questions, ask your nurse or doctor.               Start taking these medicines.        Dose/Directions    amoxicillin 500 MG capsule   Commonly known as:  AMOXIL   Used for:  OME (otitis media with effusion), left   Started by:  Rafael Smith PA-C        Dose:  500 mg   Take 1 capsule (500 mg) by mouth 3 times daily   Quantity:  30 capsule   Refills:  0            Where to get your medicines       These medications were sent to [place] [address] [place] [facility] [address] [facility]  [address] [facility] [address]    Hours:  Tech issues with their phone system Phone:  [phone]     amoxicillin 500 MG capsule                Primary Care Provider Office Phone # Fax #    David Valles PA-C [phone] [address]  [facility] [address]        Equal Access to Services     JONATHAN AGUIRRE : Hadii aad ku hadasho Soomaali, waaxda luqadaha, qaybta kaalmada adeegyada, waxay idiin hayaan adeeg kharash la'eloy . So [facility] [phone].    ATENCIÓN: Si habla español, tiene a amado disposición servicios gratuitos de asistencia lingüística. [facility] [phone].    We comply with applicable federal civil rights laws and [place] laws. We do not discriminate on the basis of race, color, national origin, age, disability, sex, sexual orientation, or gender identity.            Thank you!     Thank you for choosing [facility] URGENT CARE  for your care. Our goal is always to provide you with excellent care. Hearing back from our patients is one way we can continue to improve our services. Please take a few minutes to complete the written survey that you may receive in the mail after your visit with us. Thank you!             Your Updated Medication List - Protect others around you: Learn how to safely use, store and throw away your medicines at www.disposemymeds.org.          This list is accurate as of 10/12/18  1:46 PM.  Always use your most recent med list.                   Brand Name Dispense Instructions for use Diagnosis    ADVIL ALLERGY & CONGESTION PO      Take by mouth daily        amoxicillin 500 MG capsule    AMOXIL    30 capsule    Take 1 capsule (500 mg) by mouth 3 times daily    OME (otitis media with effusion), left       furosemide 20 MG tablet    LASIX    14 tablet    Take 1 tablet (20 mg) by mouth 2 times daily    Bilateral lower extremity edema        nabumetone 750 MG tablet    RELAFEN    30 tablet    Take 1 tablet (750 mg) by mouth 2 times daily as needed for moderate pain    Throat pain

## 2018-10-12 NOTE — PROGRESS NOTES
SUBJECTIVE:  Jacinto Cruz IV is a 33 year old male with a chief complaint of sore throat.  Onset of symptoms was 3 week(s) ago.    Course of illness: still present.  Severity moderate  Current and Associated symptoms: ear pain bilateral  Treatment measures tried include None tried.  Predisposing factors include None.    Past Medical History:   Diagnosis Date     Gastro-oesophageal reflux disease      Current Outpatient Prescriptions   Medication Sig Dispense Refill     Chlorphen-Phenyleph-Ibuprofen (ADVIL ALLERGY & CONGESTION PO) Take by mouth daily       furosemide (LASIX) 20 MG tablet Take 1 tablet (20 mg) by mouth 2 times daily (Patient not taking: Reported on 10/12/2018) 14 tablet 0     nabumetone (RELAFEN) 750 MG tablet Take 1 tablet (750 mg) by mouth 2 times daily as needed for moderate pain (Patient not taking: Reported on 3/23/2018) 30 tablet 1     Social History   Substance Use Topics     Smoking status: Former Smoker     Smokeless tobacco: Former User      Comment: quit 5/8/14 at 1230     Alcohol use Yes      Comment: occ       ROS:  Review of systems negative except as stated above.    OBJECTIVE:   /78  Pulse 77  Temp 98  F (36.7  C) (Oral)  Wt 180 lb (81.6 kg)  SpO2 98%  BMI 25.46 kg/m2  GENERAL APPEARANCE: healthy, alert and no distress  EYES: EOMI,  PERRL, conjunctiva clear  HENT: R ear canals and TM normal.  L ear canal normal.   L TM wreythematous and bulging, opaque.  Nose normal.  Pharynx erythematous with some exudate noted.  NECK: supple, non-tender to palpation, no adenopathy noted  RESP: lungs clear to auscultation - no rales, rhonchi or wheezes  CV: regular rates and rhythm, normal S1 S2, no murmur noted  ABDOMEN:  soft, nontender, no HSM or masses and bowel sounds normal  SKIN: no suspicious lesions or rashes      ASSESSMENT:  (H65.92) OME (otitis media with effusion), left  (primary encounter diagnosis)  Plan: amoxicillin (AMOXIL) 500 MG capsule

## 2018-10-12 NOTE — PATIENT INSTRUCTIONS
With distilled water 2-3x per day for a minimum 2-3 days    Otitis Media (Middle-Ear Infection) in Adults  Otitis media is another name for a middle-ear infection. It means an infection behind your eardrum. This kind of ear infection can happen after any condition that keeps fluid from draining from the middle ear. These conditions include allergies, a cold, a sore throat, or a respiratory infection.  Middle-ear infections are common in children, but they can also happen in adults. An ear infection in an adult may mean a more serious problem than in a child. So you may need additional tests. If you have an ear infection, you should see your health care provider for treatment.  What are the types of middle-ear infections?  Infections can affect the middle ear in several ways. They are:    Acute otitis media. This middle-ear infection occurs suddenly. It causes swelling and redness. Fluid and mucus become trapped inside the ear. You can have a fever and ear pain.    Otitis media with effusion. Fluid (effusion) and mucus build up in the middle ear after the infection goes away. You may feel like your middle ear is full. This can continue for months and may affect your hearing.    Chronic otitis media with effusion. Fluid (effusion) remains in the middle ear for a long time. Or it builds up again and again, even though there is no infection. This type of middle-ear infection may be hard to treat. It may also affect your hearing.  Who is more likely to get a middle-ear infection?  You are more likely to get an ear infection if you:    Smoke or are around someone who smokes    Have seasonal or year-round allergy symptoms    Have a cold or other upper respiratory infection  What causes a middle-ear infection?  The middle ear connects to the throat by a canal called the eustachian tube. This tube helps even out the pressure between the outer ear and the inner ear. A cold or allergy can irritate the tube or cause the  area around it to swell. This can keep fluid from draining from the middle ear. The fluid builds up behind the eardrum. Bacteria and viruses can grow in this fluid. The bacteria and viruses cause the middle-ear infection.  What are the symptoms of a middle-ear infection?  Common symptoms of a middle-ear infection in adults are:    Pain in 1 or both ears    Drainage from the ear    Muffled hearing    Sore throat   You may also have a fever. Rarely, your balance can be affected.  These symptoms may be the same as for other conditions. It s important to talk with your health care provider if you think you have a middle-ear infection. If you have a high fever, severe pain behind your ear, or paralysis in your face, see your provider as soon as you can.  How is a middle-ear infection diagnosed?  Your health care provider will take a medical history and do a physical exam. He or she will look at the outer ear and eardrum with an otoscope. The otoscope is a lighted tool that lets your provider see inside the ear. A pneumatic otoscope blows a puff of air into the ear to check how well your eardrum moves. If you eardrum doesn t move well, it may mean you have fluid behind it.  Your provider may also do a test called tympanometry. This test tells how well the middle ear is working. It can find any changes in pressure in the middle ear. Your provider may test your hearing with a tuning fork.  How is a middle-ear infection treated?  A middle-ear infection may be treated with:    Antibiotics, taken by mouth or as ear drops    Medication for pain    Decongestants, antihistamines, or nasal steroids  Your health care provider may also have you try autoinsufflation. This helps adjust the air pressure in your ear. For this, you pinch your nose and gently exhale. This forces air back through the eustachian tube.  The exact treatment for your ear infection will depend on the type of infection you have. In general, if your symptoms  don t get better in 48 to 72 hours, contact your health care provider.  Middle-ear infections can cause long-term problems if not treated. They can lead to:    Infection in other parts of the head    Permanent hearing loss    Paralysis of a nerve in your face  If you have a middle-ear infection that doesn t get better, you may need to see an ear, nose, and throat specialist (otolaryngologist). You may need a CT scan or MRI to check for head and neck cancer.  Ear tubes  Sometimes fluid stays in the middle ear even after you take antibiotics and the infection goes away. In this case, your health care provider may suggest that a small tube be placed in your ear. The tube is put at the opening of the eardrum. The tube keeps fluid from building up and relieves pressure in the middle ear. It can also help you hear better. This surgery is called myringotomy. It is not often done in adults.  The tubes usually fall out on their own after 6 months to a year.    3178-7579 [employer]. [address] [place] [address], [place] [address]. All rights reserved. This information is not intended as a substitute for professional medical care. Always follow your healthcare professional's instructions.

## 2018-11-12 ENCOUNTER — OFFICE VISIT (OUTPATIENT)
Dept: URGENT CARE | Facility: URGENT CARE | Age: 33
End: 2018-11-12
Payer: COMMERCIAL

## 2018-11-12 VITALS
DIASTOLIC BLOOD PRESSURE: 82 MMHG | HEART RATE: 68 BPM | OXYGEN SATURATION: 98 % | SYSTOLIC BLOOD PRESSURE: 120 MMHG | TEMPERATURE: 98.4 F

## 2018-11-12 DIAGNOSIS — B37.2 CANDIDIASIS OF SKIN: Primary | ICD-10-CM

## 2018-11-12 PROCEDURE — 99213 OFFICE O/P EST LOW 20 MIN: CPT | Performed by: HOSPITALIST

## 2018-11-12 RX ORDER — PRENATAL VIT 91/IRON/FOLIC/DHA 28-975-200
COMBINATION PACKAGE (EA) ORAL 2 TIMES DAILY
Qty: 12 G | Refills: 1 | Status: SHIPPED | OUTPATIENT
Start: 2018-11-12 | End: 2022-10-06

## 2018-11-12 NOTE — PROGRESS NOTES
Pt came here for pain on the buttocks area. Apparently this happened after he was started on amoxicillin for his ear infection. He said that he has more loose stool, due to that patient has pain due to the loose stool. He denies any other complain     Allergies   Allergen Reactions     Ibuprofen        Past Medical History:   Diagnosis Date     Gastro-oesophageal reflux disease          Current Outpatient Prescriptions on File Prior to Visit:  Chlorphen-Phenyleph-Ibuprofen (ADVIL ALLERGY & CONGESTION PO) Take by mouth daily     No current facility-administered medications on file prior to visit.     Social History   Substance Use Topics     Smoking status: Former Smoker     Smokeless tobacco: Former User      Comment: quit 5/8/14 at 1230     Alcohol use Yes      Comment: occ       ROS:  Consitutional: As above  ENT: As above  Respiratory: As above    OBJECTIVE:  /82 (BP Location: Right arm, Patient Position: Chair, Cuff Size: Adult Regular)  Pulse 68  Temp 98.4  F (36.9  C) (Oral)  SpO2 98%  GENERAL APPEARANCE: healthy, alert and minimal distress  EYES: conjunctiva clear  EARS:no cerumen.   Ear canals no erythema, TM's intact no erythema minimal effusion noted  On the buttocks area there is some rash near the anus area. slight discomfort to touch.     No results found for this or any previous visit (from the past 168 hour(s)).     ASSESSMENT:     ICD-10-CM    1. Candidiasis of skin B37.2 terbinafine (LAMISIL AT) 1 % cream         PLAN:    Seem to be candida infection. Will start Lamisil cream, recommend to apply some Desitin or some diaper rash cream for skin protection for 1-2 weeks. Tylenol and ibuprofen prn for  pain  Lots of rest and fluids.  Follow up in 1-2 weeks  if not better or sooner if getting worse .    Hina Van MD

## 2018-11-12 NOTE — MR AVS SNAPSHOT
After Visit Summary   11/12/2018    Jacinto Cruz IV    MRN: [id number]           Patient Information     Date Of Birth          1985        Visit Information        Provider Department      11/12/2018 1:00 PM Hina Van MD [facility] URGENT CARE        Today's Diagnoses     Candidiasis of skin    -  1       Follow-ups after your visit        Who to contact     If you have questions or need follow up information about today's clinic visit or your schedule please contact [facility] URGENT CARE directly at [phone].  Normal or non-critical lab and imaging results will be communicated to you by [email or web address], letter or phone within 4 business days after the clinic has received the results. If you do not hear from us within 7 days, please contact the clinic through locrt or phone. If you have a critical or abnormal lab result, we will notify you by phone as soon as possible.  Submit refill requests through [email or web address] or call your pharmacy and they will forward the refill request to us. Please allow 3 business days for your refill to be completed.          Additional Information About Your Visit        MyChart Information     [email or web address] gives you secure access to your electronic health record. If you see a primary care provider, you can also send messages to your care team and make appointments. If you have questions, please call your primary care clinic.  If you do not have a primary care provider, please call [phone] and they will assist you.        Care EveryWhere ID     This is your Care EveryWhere ID. This could be used by other organizations to access your [place] medical records  [id number]        Your Vitals Were     Pulse Temperature Pulse Oximetry             68 98.4  F (36.9  C) (Oral) 98%          Blood Pressure from Last 3 Encounters:   11/12/18 120/82   10/12/18 116/78   04/12/18 114/76    Weight from Last 3 Encounters:   10/12/18 180 lb (81.6 kg)    04/12/18 180 lb (81.6 kg)   03/23/18 181 lb 1.6 oz (82.1 kg)              Today, you had the following     No orders found for display         Today's Medication Changes          These changes are accurate as of 11/12/18  1:48 PM.  If you have any questions, ask your nurse or doctor.               Start taking these medicines.        Dose/Directions    terbinafine 1 % cream   Commonly known as:  lamISIL AT   Used for:  Candidiasis of skin   Started by:  Hina Van MD        Apply topically 2 times daily For fungal infection not resolved with other antifungals (e.g. Clotrimazole)   Quantity:  12 g   Refills:  1            Where to get your medicines      These medications were sent to [place] [id number] [address]    Hours:  Tech issues with their phone system Phone:  [phone]     terbinafine 1 % cream                Primary Care Provider Office Phone # Fax #    David Valles PA-C [phone] [address] [facility] [address]        Equal Access to Services     MARCELLO AGUIRRE AH: Hadii aad ku hadasho Soomaali, waaxda luqadaha, qaybta kaalmada adeegyada, shane luna hayismaeln cory shelton . So [facility] [phone].    ATENCIÓN: Si habla español, tiene a amado disposición servicios gratuitos de asistencia lingüística. [facility] [phone].    We comply with applicable federal civil rights laws and [place] laws. We do not discriminate on the basis of race, color, national origin, age, disability, sex, sexual orientation, or gender identity.            Thank you!     Thank you for choosing [facility]  for your care. Our goal is always to provide you with excellent care. Hearing back from our patients is one way we can continue to improve our services. Please take a few minutes to complete the written survey that you may receive in the mail after your visit with us. Thank you!              Your Updated Medication List - Protect others around you: Learn how to safely use, store and throw away your medicines at www.disposemymeds.org.          This list is accurate as of 11/12/18  1:48 PM.  Always use your most recent med list.                   Brand Name Dispense Instructions for use Diagnosis    ADVIL ALLERGY & CONGESTION PO      Take by mouth daily        terbinafine 1 % cream    lamISIL AT    12 g    Apply topically 2 times daily For fungal infection not resolved with other antifungals (e.g. Clotrimazole)    Candidiasis of skin

## 2019-12-15 ENCOUNTER — HEALTH MAINTENANCE LETTER (OUTPATIENT)
Age: 34
End: 2019-12-15

## 2020-11-04 ENCOUNTER — OFFICE VISIT (OUTPATIENT)
Dept: URGENT CARE | Facility: URGENT CARE | Age: 35
End: 2020-11-04
Payer: COMMERCIAL

## 2020-11-04 VITALS
HEART RATE: 89 BPM | SYSTOLIC BLOOD PRESSURE: 114 MMHG | DIASTOLIC BLOOD PRESSURE: 80 MMHG | RESPIRATION RATE: 24 BRPM | OXYGEN SATURATION: 98 % | TEMPERATURE: 100.2 F

## 2020-11-04 DIAGNOSIS — H83.02 LABYRINTHITIS OF LEFT EAR: ICD-10-CM

## 2020-11-04 DIAGNOSIS — J01.00 ACUTE NON-RECURRENT MAXILLARY SINUSITIS: Primary | ICD-10-CM

## 2020-11-04 PROCEDURE — 99214 OFFICE O/P EST MOD 30 MIN: CPT | Performed by: FAMILY MEDICINE

## 2020-11-04 RX ORDER — PSEUDOEPHEDRINE HCL 30 MG
30-60 TABLET ORAL
COMMUNITY
Start: 2019-05-21 | End: 2022-10-06

## 2020-11-04 RX ORDER — FLUTICASONE PROPIONATE 50 MCG
1-2 SPRAY, SUSPENSION (ML) NASAL DAILY
Qty: 16 G | Refills: 0 | Status: SHIPPED | OUTPATIENT
Start: 2020-11-04

## 2020-11-04 RX ORDER — MECLIZINE HYDROCHLORIDE 25 MG/1
25 TABLET ORAL 3 TIMES DAILY PRN
Qty: 30 TABLET | Refills: 0 | Status: SHIPPED | OUTPATIENT
Start: 2020-11-04 | End: 2022-10-06

## 2020-11-04 RX ORDER — OXYMETAZOLINE HYDROCHLORIDE 0.05 G/100ML
2 SPRAY NASAL
COMMUNITY
Start: 2019-05-21 | End: 2022-10-06

## 2020-11-04 NOTE — LETTER
[facility]  [address]  [facility] [address]  [phone]      November 4, 2020    RE:  Jacinto YANEZ Anthony EARLY                                                                                                                                                       [id number] [facility] [address]            To whom it may concern:    Jacinto Edmondo NNEKA is under my professional care for    Acute non-recurrent maxillary sinusitis  Labyrinthitis of left ear.   He  may return to work with the following: No restrictions  When vertigo symptoms (balance difficulties) are resolved        Sincerely,        Zoe Phelps MD    [facility]

## 2020-11-05 NOTE — PROGRESS NOTES
SUBJECTIVE:   Chief Complaint   Patient presents with     Urgent Care     Ear Problem     Left ear pain and vertigo-Patient has these episodes often-Patient also had fevers on Monday      Jacinto Cruz IV is a 35 year old male complains of dizziness for 1 week with gradual worsening.   Timing: sudden onset and gradual onset, light activity, change in  position and movement of head.  Quality: room spinning/falling/movement, off balance, light-headedness.  The symptoms become more intense with :movement of the head, light exertion.  Associated symptoms:sinus congestion;  Facial pain and pressure  does interfere with activities of daily living;  reports some previous problems with similar symptoms,  Multiple episodes over the last 18 months,  No injury associated with  The vertigo episodes    He had covid test 4 days ago ([facility]) awaiting results     Past Medical History:   Diagnosis Date     Gastro-oesophageal reflux disease      Patient Active Problem List   Diagnosis     Elevated blood pressure reading without diagnosis of hypertension     Episodic tension-type headache, not intractable       ALLERGIES:  Ibuprofen    MEDs       Chlorphen-Phenyleph-Ibuprofen (ADVIL ALLERGY & CONGESTION PO),        Chlorphen-Phenyleph-Ibuprofen (ADVIL ALLERGY & CONGESTION PO), Take by mouth daily       Oxymetazoline HCl (NASAL SPRAY) 0.05 % SOLN, 2 sprays       pseudoePHEDrine (SUDAFED) 30 MG tablet, Take 30-60 mg by mouth       terbinafine (LAMISIL AT) 1 % cream, Apply topically 2 times daily For fungal infection not resolved with other antifungals (e.g. Clotrimazole)    No current facility-administered medications on file prior to visit.       Social History     Tobacco Use     Smoking status: Former Smoker     Smokeless tobacco: Former User     Tobacco comment: quit 5/8/14 at 1230   Substance Use Topics     Alcohol use: Yes     Comment: occ       Family History   Problem Relation Age of Onset     Diabetes Sister         ROS:  CONSTITUTIONAL:NEGATIVE for fever, chills,   INTEGUMENTARY/SKIN: NEGATIVE for worrisome rashes,  or lesions  EYES: NEGATIVE for vision changes or irritation  RESP:NEGATIVE for significant cough or SOB  GI: NEGATIVE for nausea, abdominal pain, , or change in bowel habits    OBJECTIVE:  /80 (BP Location: Right arm, Patient Position: Sitting, Cuff Size: Adult Large)   Pulse 89   Temp 100.2  F (37.9  C) (Tympanic)   Resp 24   SpO2 98%      GENERAL APPEARANCE: alert, mild distress, cooperative,   EYES: EOMI,  PERRL, conjunctiva clear,  Fundi normal  HENT: ear canals and TM's normal.  Nose and mouth without ulcers, erythema or lesions.    sinus tenderness with percussion frontal and maxillary  NECK: supple, nontender, no lymphadenopathy  RESP: lungs clear to auscultation - no rales, rhonchi or wheezes  CV: regular rates and rhythm, normal S1 S2, no murmur noted  SKIN: no suspicious lesions or rashes     NEURO:  Mental status normal. Gait and station normal. Cerebellar function is normal.   Motor, sensory function normal in all extremities.    Walks  With mild difficulty. Due to vertigo, has to move slowly and grabs onto furniture for balance support.   Normal strength and tone,  Normal speech and mentation, cranial nerves normal  Pulse regular. Rapid changes in head position during the exam do precipitate brief dizziness  .        ASSESSMENT:  Acute non-recurrent maxillary sinusitis     - amoxicillin-clavulanate (AUGMENTIN) 875-125 MG tablet; Take 1 tablet by mouth 2 times daily for 10 days  - fluticasone (FLONASE) 50 MCG/ACT nasal spray; Spray 1-2 sprays into both nostrils daily       We discussed the primary importance of home cares to promote drainage and ventilation of the sinuses to decrease symptoms of sinus pressure and to eliminate infectious drainage from the sinuses.  I encouraged the use of saline nasal spray as needed to promote cleaning of the nasal passages and to promote drainage of  the sinuses.  Allergy medications and steroid nasal spray help reduce swelling within the nasal tissue and may help open drainage/ ventilation passages to the sinuses.  Expectorants are recommended rather than decongestants to help promote sinus drainage.  Antibiotics are discussed as a secondary therapy for sinus infections that are unresponsive to home measures to promote sinus drainage and ventilation.     Follow up with primary clinic if not improving    Labyrinthitis of left ear     - meclizine (ANTIVERT) 25 MG tablet; Take 1 tablet (25 mg) by mouth 3 times daily as needed for dizziness     The patient is reassured that these symptoms do not appear to represent a serious or threatening condition. This is generally a self-limited temporary but uncomfortable situation.  We discussed the role of the semi-circular canals in detecting and maintaining balance and that a viral illness in the ear/ sinus can cause swelling in the semi-circular canals and promote vertigo symptoms      Rest, avoid potentially dangerous activities (such as driving or working with machinery or at heights), use OTC Meclizine prn.      Should go to the ED if patient develops other symptoms, such as alterations of speech, swallowing, vision, motor or sensory systems.      Follow-up with primary care or ENT if dizziness persists or worsens.    Patient education materials were provided about diagnosis and treatment     Note for work

## 2021-01-15 ENCOUNTER — HEALTH MAINTENANCE LETTER (OUTPATIENT)
Age: 36
End: 2021-01-15

## 2021-03-24 NOTE — PATIENT INSTRUCTIONS
Patient Education     Sinusitis (Antibiotic Treatment)    The sinuses are air-filled spaces within the bones of the face. They connect to the inside of the nose. Sinusitis is an inflammation of the tissue that lines the sinuses. Sinusitis can occur during a cold. It can also happen due to allergies to pollens and other particles in the air. Sinusitis can cause symptoms of sinus congestion and a feeling of fullness. A sinus infection causes fever, headache, and facial pain. There is often green or yellow fluid draining from the nose or into the back of the throat (post-nasal drip). You have been given antibiotics to treat this condition.   Home care    Take the full course of antibiotics as instructed. Don't stop taking them, even when you feel better.    Drink plenty of water, hot tea, and other liquids as directed by the healthcare provider. This may help thin nasal mucus. It also may help your sinuses drain fluids.    Heat may help soothe painful areas of your face. Use a towel soaked in hot water. Or,  the shower and direct the warm spray onto your face. Using a vaporizer along with a menthol rub at night may also help soothe symptoms.     An expectorant with guaifenesin may help thin nasal mucus and help your sinuses drain fluids. Talk with your provider or pharmacists before taking an over-the-counter (OTC) medicine if you have any questions about it or its side effects..    You can use an OTC decongestant, unless a similar medicine was prescribed to you. Nasal sprays work the fastest. Use one that contains phenylephrine or oxymetazoline. First blow your nose gently. Then use the spray. Don't use these medicines more often than directed on the label. If you do, your symptoms may get worse. You may also take pills that contain pseudoephedrine. Don t use products that combine multiple medicines. This is because side effects may be increased. Read labels. You can also ask the pharmacist for help. (People  Lvm to conf pt virtual visit tomorrow at 2:30 pm. Left detailed instruction of the appt and activating portal in order to have visit. Also stated in the message that I do have two opening for in person tomorrow as well if she preferred that instead of virtual. Did state the slot do fill up quickly, if she can give us a call back.   with high blood pressure should not use decongestants. They can raise blood pressure.) Talk with your provider or pharmacist if you have any questions about the medicine..    OTC antihistamines may help if allergies contributed to your sinusitis. Talk with your provider or pharmacist if you have any questions about the medicine..    Don't use nasal rinses or irrigation during an acute sinus infection, unless your healthcare provider tells you to. Rinsing may spread the infection to other areas in your sinuses.    Use acetaminophen or ibuprofen to control pain, unless another pain medicine was prescribed to you. If you have chronic liver or kidney disease or ever had a stomach ulcer, talk with your healthcare provider before using these medicines. Never give aspirin to anyone under age 18 who is ill with a fever. It may cause severe liver damage.    Don't smoke. This can make symptoms worse.    Follow-up care  Follow up with your healthcare provider, or as advised.   When to seek medical advice  Call your healthcare provider if any of these occur:     Facial pain or headache that gets worse    Stiff neck    Unusual drowsiness or confusion    Swelling of your forehead or eyelids    Symptoms don't go away in 10 days    Vision problems, such as blurred or double vision    Fever of 100.4 F (38 C) or higher, or as directed by your healthcare provider  Call 911  Call 911 if any of these occur:     Seizure    Trouble breathing    Feeling dizzy or faint    Fingernails, skin or lips look blue, purple , or gray  Prevention  Here are steps you can take to help prevent an infection:     Keep good hand washing habits.    Don t have close contact with people who have sore throats, colds, or other upper respiratory infections.    Don t smoke, and stay away from secondhand smoke.    Stay up to date with of your vaccines.  [employer] last reviewed this educational content on 12/1/2019 2000-2020 [employer] [address]  [place] [address]. All rights reserved. This information is not intended as a substitute for professional medical care. Always follow your healthcare professional's instructions.           Patient Education     Labyrinthitis    The inner ear is located behind the middle ear. It is part of the balance center of your body. When the inner ear becomes irritated or inflamed it causes a condition known as labyrinthitis. It may due to a viral infection, but often a cause is not found. Labyrinthitis causes sudden dizziness and balance problems. It often causes a feeling that you or the room is spinning (vertigo). You may feel nauseated or throw up. You may also feel a loss of balance when trying to walk. Head movement from side to side or changes in body position (from lying to sitting or standing) may worsen symptoms. You may have ringing in the ear. Hearing may also be affected.  An episode of labyrinthitis may last seconds, minutes, or hours. It may never return. Or symptoms may recur off and on over several weeks or longer. In many cases, the problem is short-term and goes away when the inner ear issue resolves.  Home care    Take medicine as prescribed to relieve your symptoms. Unless another medicine was prescribed, you can try over-the-counter motion sickness pills. Note that these medicines may cause drowsiness.    If symptoms are severe, rest quietly in bed. Change positions slowly. There may be 1 position feels best, such as lying on 1 side or lying on your back with your head slightly raised on pillows. Until you have no symptoms, you are at a higher risk of falling. Let someone help you when you get up. Get rid of home hazards such as loose electrical cords and throw rugs. Don t walk in unfamiliar areas that are not lighted. Use night lights in bathrooms and kitchen areas.    Vestibular rehabilitation exercises are done by moving your head to help fix problems in the inner ear. If these  exercises have been prescribed, do them as you have been instructed.    Do not drive or work with dangerous machinery until 1 week has passed without symptoms. Be careful when using stairs.    Follow-up care  Follow up with your healthcare provider or as advised by our staff.  When to seek medical advice  Call your healthcare provider for any of the following:    Symptoms that are not controlled by medicine     Symptoms that get worse    Repeated vomiting that is not relieved by medicine    Weakness that gets worse    Fainting    Headache or unusual drowsiness    Trouble with vision or speech    Trouble moving your face, arms, or legs    Hearing loss    Symptoms that last more than a few days  [employer] last reviewed this educational content on 11/1/2017 2000-2020 [employer]. [address] [place] [address], [place] [address]. All rights reserved. This information is not intended as a substitute for professional medical care. Always follow your healthcare professional's instructions.

## 2021-09-20 ENCOUNTER — OFFICE VISIT (OUTPATIENT)
Dept: URGENT CARE | Facility: URGENT CARE | Age: 36
End: 2021-09-20
Payer: COMMERCIAL

## 2021-09-20 VITALS
TEMPERATURE: 97.7 F | RESPIRATION RATE: 24 BRPM | HEART RATE: 74 BPM | SYSTOLIC BLOOD PRESSURE: 129 MMHG | OXYGEN SATURATION: 100 % | DIASTOLIC BLOOD PRESSURE: 86 MMHG

## 2021-09-20 DIAGNOSIS — H65.02 NON-RECURRENT ACUTE SEROUS OTITIS MEDIA OF LEFT EAR: Primary | ICD-10-CM

## 2021-09-20 PROCEDURE — 99214 OFFICE O/P EST MOD 30 MIN: CPT | Performed by: PHYSICIAN ASSISTANT

## 2021-09-20 RX ORDER — PREDNISONE 20 MG/1
40 TABLET ORAL DAILY
Qty: 10 TABLET | Refills: 0 | Status: SHIPPED | OUTPATIENT
Start: 2021-09-20 | End: 2021-09-25

## 2021-09-21 NOTE — PROGRESS NOTES
URGENT CARE VISIT:    SUBJECTIVE:   Jacinto Cruz IV is a 36 year old male presenting with a chief complaint of ear pain left.  Onset was 2 week(s) ago.   He denies the following symptoms: fever, chills, stuffy nose, cough - non-productive and sore throat  Course of illness is same.    Treatment measures tried include Tylenol/Ibuprofen, Decongestants and Mucinex with no relief of symptoms.  Predisposing factors include None.    PMH:   Past Medical History:   Diagnosis Date     Gastro-oesophageal reflux disease      Allergies: Ibuprofen   Medications:   Current Outpatient Medications   Medication Sig Dispense Refill     Chlorphen-Phenyleph-Ibuprofen (ADVIL ALLERGY & CONGESTION PO)        Chlorphen-Phenyleph-Ibuprofen (ADVIL ALLERGY & CONGESTION PO) Take by mouth daily       fluticasone (FLONASE) 50 MCG/ACT nasal spray Spray 1-2 sprays into both nostrils daily 16 g 0     meclizine (ANTIVERT) 25 MG tablet Take 1 tablet (25 mg) by mouth 3 times daily as needed for dizziness 30 tablet 0     Oxymetazoline HCl (NASAL SPRAY) 0.05 % SOLN 2 sprays       predniSONE (DELTASONE) 20 MG tablet Take 2 tablets (40 mg) by mouth daily for 5 days 10 tablet 0     pseudoePHEDrine (SUDAFED) 30 MG tablet Take 30-60 mg by mouth       terbinafine (LAMISIL AT) 1 % cream Apply topically 2 times daily For fungal infection not resolved with other antifungals (e.g. Clotrimazole) (Patient not taking: Reported on 9/20/2021) 12 g 1     Social History:   Social History     Tobacco Use     Smoking status: Former Smoker     Smokeless tobacco: Former User     Tobacco comment: quit 5/8/14 at 1230   Substance Use Topics     Alcohol use: Yes     Comment: occ       ROS:  General: negative  Skin: negative  Eyes: negative  Ears/Nose/Throat: left ear pain  Respiratory: No shortness of breath, dyspnea on exertion, cough, or hemoptysis  Cardiovascular: negative  Gastrointestinal: negative  Genitourinary: negative  Musculoskeletal: negative  Neurologic:  negative    OBJECTIVE:  /86   Pulse 74   Temp 97.7  F (36.5  C) (Tympanic)   Resp 24   SpO2 100%   GENERAL APPEARANCE: healthy, alert and no distress  EYES: EOMI,  PERRL, conjunctiva clear  HENT: ear canals and TM's normal.  Nose and mouth without ulcers, erythema or lesions  NECK: supple, nontender, no lymphadenopathy  RESP: lungs clear to auscultation - no rales, rhonchi or wheezes  CV: regular rates and rhythm, normal S1 S2, no murmur noted  SKIN: no suspicious lesions or rashes    ASSESSMENT:    ICD-10-CM    1. Non-recurrent acute serous otitis media of left ear  H65.02 Otolaryngology Referral     predniSONE (DELTASONE) 20 MG tablet       PLAN:  Patient Instructions   Ddx discussed with patient includes serous otitis media, eustachian tube dysfunction, and TMJ. Less likely TMJ due to muffled hearing.  Conservative measures discussed including over-the-counter Pseudofed. See your primary care provider if symptoms do not improve in 2 weeks. Referred to ENT. Seek emergency care if you develop severe ear pain, swelling, or redness. Patient verbalized understanding and is agreeable to plan. The patient was discharged ambulatory and in stable condition.    Gisselle Townsend PA-C ....................  9/20/2021   7:45 PM

## 2021-09-21 NOTE — PATIENT INSTRUCTIONS
Patient was educated on the natural course of condition which typically resolves on its own. Usually takes around 2 weeks to clear, but some cases can take up to 8 weeks. Conservative measures discussed including over-the-counter Pseudofed. See your primary care provider if symptoms do not improve in 2 weeks. Seek emergency care if you develop severe ear pain, swelling, or redness.     Patient Education     Earache, No Infection (Adult)   Earaches can happen without an infection. They can occur when air and fluid build up behind the eardrum. They may cause a feeling of fullness and discomfort. They may also impair hearing. This is called otitis media with effusion (OME) or serous otitis media. It means there is fluid in the middle ear. It is not the same as acute otitis media, which is often from an infection.  OME can happen when you have a cold if congestion blocks the passage that drains the middle ear. This passage is called the eustachian tube. OME may also occur with nasal allergies or after a bacterial infection in the middle ear. Other causes are:    Trauma    Improper cleaning of wax from the ear    Bacterial infection of the mastoid bone (mastoiditis)    Tumor    Jaw pain    Changes in pressure, such as from flying or scuba diving    The pain or discomfort may come and go. You may hear clicking or popping sounds when you chew or swallow. You may feel that your balance is off. Or you may hear ringing in the ear.  It often takes from several weeks up to 3 months for the fluid to clear on its own. Oral pain relievers and ear drops help if there is pain. Decongestants and antihistamines sometimes help. Antibiotics don't help since there is no infection. Your healthcare provider may give you a nasal spray to help reduce swelling in the nose and eustachian tube. This can allow the ear to drain.  If your OME doesn't get better after 3 months, surgery may be used to drain the fluid. A small tube may also be put in  the eardrum to help with drainage.  Because the middle ear fluid can become infected, watch for signs of an infection. These may develop later. They may include increased ear pain, fever, or drainage from the ear.  Home care  These home-care tips will help you take care of yourself:    You may use over-the-counter medicine as directed by your healthcare provider to control pain, unless medicine was prescribed. If you have chronic liver or kidney disease or ever had a stomach ulcer or GI bleeding, talk with your healthcare provider before using any medicines.    Aspirin should never be used in anyone younger than age 18 who has a fever. It may cause severe liver damage.    Ask your healthcare provider if you may use over-the-counter decongestants such as phenylephrine or pseudoephedrine. Keep in mind they are not always helpful.    Talk with your healthcare provider about using nasal spray decongestants. Don't use them for more than 3 days, or as directed by your healthcare provider. Longer use can make congestion worse. Prescription nasal sprays from your healthcare provider don't often have such restrictions.    Antihistamines may help if you are also having allergy symptoms.    You may use medicines such as guaifenesin to thin mucus and help with drainage.  Follow-up care  Follow up with your healthcare provider or as advised if you are not feeling better after 3 days.  When to seek medical advice  Call your healthcare provider right away if any of these occur:    Ear pain that gets worse or that does not start to get better     Fever of 100.4 F (38 C) or higher, or as directed by your healthcare provider    Fluid or blood draining from the ear    Headache or sinus pain    Stiff neck    Unusual drowsiness or confusion  [employer] last reviewed this educational content on 12/1/2019 2000-2021 [employer]. All rights reserved. This information is not intended as a substitute for professional medical  care. Always follow your healthcare professional's instructions.

## 2021-10-24 ENCOUNTER — HEALTH MAINTENANCE LETTER (OUTPATIENT)
Age: 36
End: 2021-10-24

## 2022-02-13 ENCOUNTER — HEALTH MAINTENANCE LETTER (OUTPATIENT)
Age: 37
End: 2022-02-13

## 2022-05-05 ENCOUNTER — OFFICE VISIT (OUTPATIENT)
Dept: URGENT CARE | Facility: URGENT CARE | Age: 37
End: 2022-05-05
Payer: COMMERCIAL

## 2022-05-05 VITALS
DIASTOLIC BLOOD PRESSURE: 108 MMHG | HEART RATE: 79 BPM | BODY MASS INDEX: 25.18 KG/M2 | SYSTOLIC BLOOD PRESSURE: 155 MMHG | TEMPERATURE: 98.7 F | WEIGHT: 178 LBS | OXYGEN SATURATION: 97 %
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
## 2022-05-05 PROCEDURE — 99213 OFFICE O/P EST LOW 20 MIN: CPT | Performed by: FAMILY MEDICINE

## 2022-05-05 RX ORDER — CEPHALEXIN 500 MG/1
500 CAPSULE ORAL 3 TIMES DAILY
Qty: 15 CAPSULE | Refills: 0 | Status: SHIPPED | OUTPATIENT
Start: 2022-05-05 | End: 2022-05-10

## 2022-05-05 NOTE — PROGRESS NOTES
SUBJECTIVE  Jacinto Cruz IV is a 36 year old male who presents with a chief complaint of an animal bite on the fingers.  .bernardo was bitten by a dog yesterday.   Cicumstances of bite: animals fighting.  Severity: mild.  animal known and can be observed for 10 days   Associated symptoms: immediate pain, redness noted    last tetanus booster within 10 years    Past Medical History:   Diagnosis Date     Gastro-oesophageal reflux disease        Current Outpatient Medications:      cephALEXin (KEFLEX) 500 MG capsule, Take 1 capsule (500 mg) by mouth 3 times daily for 5 days, Disp: 15 capsule, Rfl: 0     Chlorphen-Phenyleph-Ibuprofen (ADVIL ALLERGY & CONGESTION PO), , Disp: , Rfl:      Chlorphen-Phenyleph-Ibuprofen (ADVIL ALLERGY & CONGESTION PO), Take by mouth daily, Disp: , Rfl:      fluticasone (FLONASE) 50 MCG/ACT nasal spray, Spray 1-2 sprays into both nostrils daily, Disp: 16 g, Rfl: 0     meclizine (ANTIVERT) 25 MG tablet, Take 1 tablet (25 mg) by mouth 3 times daily as needed for dizziness, Disp: 30 tablet, Rfl: 0     Oxymetazoline HCl (NASAL SPRAY) 0.05 % SOLN, 2 sprays, Disp: , Rfl:      pseudoePHEDrine (SUDAFED) 30 MG tablet, Take 30-60 mg by mouth, Disp: , Rfl:      terbinafine (LAMISIL AT) 1 % cream, Apply topically 2 times daily For fungal infection not resolved with other antifungals (e.g. Clotrimazole) (Patient not taking: Reported on 9/20/2021), Disp: 12 g, Rfl: 1  History   Smoking Status     Former Smoker   Smokeless Tobacco     Former User     Comment: quit 5/8/14 at 1230         ROS:  Review of systems negative except as stated above.    OBJECTIVE:  BP (!) 155/108 (BP Location: Right arm, Patient Position: Sitting, Cuff Size: Adult Regular)   Pulse 79   Temp 98.7  F (37.1  C) (Oral)   Wt 80.7 kg (178 lb)   SpO2 97%   BMI 25.18 kg/m    GENERAL: healthy, alert no acute distress  SKIN: puncture wound of fingers  MS:extremities normal- no gross deformities noted,  FROM noted in all  extremities  NEURO: Normal strength and tone, sensory exam grossly normal,  normal speech and mentation    ASSESSMENT:  Animal bite  puncture wound    PLAN:  Warm soapy soaks bid for 2 days.  Cephalexin 500 mg bid for 5 days.  Printed info on dog bites.  Follow up with primary care provider in no improvement.

## 2022-09-01 ENCOUNTER — HOSPITAL ENCOUNTER (EMERGENCY)
Facility: CLINIC | Age: 37
Discharge: HOME | End: 2022-09-01
Attending: EMERGENCY MEDICINE | Admitting: EMERGENCY MEDICINE
Payer: COMMERCIAL

## 2022-09-01 VITALS
WEIGHT: 177.47 LBS | OXYGEN SATURATION: 98 % | HEART RATE: 80 BPM | TEMPERATURE: 98.4 F | BODY MASS INDEX: 25.1 KG/M2 | SYSTOLIC BLOOD PRESSURE: 143 MMHG | DIASTOLIC BLOOD PRESSURE: 80 MMHG | RESPIRATION RATE: 20 BRPM

## 2022-09-01 DIAGNOSIS — T78.40XA ALLERGIC REACTION, INITIAL ENCOUNTER: ICD-10-CM

## 2022-09-01 PROCEDURE — 250N000011 HC RX IP 250 OP 636: Performed by: EMERGENCY MEDICINE

## 2022-09-01 PROCEDURE — 258N000003 HC RX IP 258 OP 636: Performed by: EMERGENCY MEDICINE

## 2022-09-01 PROCEDURE — 96361 HYDRATE IV INFUSION ADD-ON: CPT

## 2022-09-01 PROCEDURE — 250N000009 HC RX 250: Performed by: EMERGENCY MEDICINE

## 2022-09-01 PROCEDURE — 96374 THER/PROPH/DIAG INJ IV PUSH: CPT

## 2022-09-01 PROCEDURE — 96375 TX/PRO/DX INJ NEW DRUG ADDON: CPT

## 2022-09-01 PROCEDURE — 99284 EMERGENCY DEPT VISIT MOD MDM: CPT | Mod: 25

## 2022-09-01 RX ORDER — PREDNISONE 20 MG/1
TABLET ORAL
Qty: 8 TABLET | Refills: 0 | Status: SHIPPED | OUTPATIENT
Start: 2022-09-02 | End: 2022-10-06

## 2022-09-01 RX ORDER — EPINEPHRINE 0.3 MG/.3ML
0.3 INJECTION SUBCUTANEOUS
Qty: 1 EACH | Refills: 0 | Status: SHIPPED | OUTPATIENT
Start: 2022-09-01

## 2022-09-01 RX ORDER — DIPHENHYDRAMINE HYDROCHLORIDE 50 MG/ML
25 INJECTION INTRAMUSCULAR; INTRAVENOUS ONCE
Status: COMPLETED | OUTPATIENT
Start: 2022-09-01 | End: 2022-09-01

## 2022-09-01 RX ORDER — LIDOCAINE 40 MG/G
CREAM TOPICAL
Status: DISCONTINUED | OUTPATIENT
Start: 2022-09-01 | End: 2022-09-02 | Stop reason: HOSPADM

## 2022-09-01 RX ORDER — SODIUM CHLORIDE 9 MG/ML
INJECTION, SOLUTION INTRAVENOUS CONTINUOUS
Status: DISCONTINUED | OUTPATIENT
Start: 2022-09-01 | End: 2022-09-02 | Stop reason: HOSPADM

## 2022-09-01 RX ORDER — FAMOTIDINE 20 MG/1
20 TABLET, FILM COATED ORAL 2 TIMES DAILY
Qty: 10 TABLET | Refills: 0 | Status: SHIPPED | OUTPATIENT
Start: 2022-09-01 | End: 2022-10-06

## 2022-09-01 RX ORDER — METHYLPREDNISOLONE SODIUM SUCCINATE 125 MG/2ML
125 INJECTION, POWDER, LYOPHILIZED, FOR SOLUTION INTRAMUSCULAR; INTRAVENOUS ONCE
Status: COMPLETED | OUTPATIENT
Start: 2022-09-01 | End: 2022-09-01

## 2022-09-01 RX ORDER — DIPHENHYDRAMINE HCL 25 MG
25 CAPSULE ORAL EVERY 6 HOURS PRN
Qty: 30 CAPSULE | Refills: 0 | Status: SHIPPED | OUTPATIENT
Start: 2022-09-01 | End: 2022-10-06

## 2022-09-01 RX ADMIN — EPINEPHRINE 0.3 MG: 1 INJECTION INTRAMUSCULAR; INTRAVENOUS; SUBCUTANEOUS at 19:59

## 2022-09-01 RX ADMIN — FAMOTIDINE 20 MG: 10 INJECTION INTRAVENOUS at 20:10

## 2022-09-01 RX ADMIN — METHYLPREDNISOLONE SODIUM SUCCINATE 125 MG: 125 INJECTION, POWDER, FOR SOLUTION INTRAMUSCULAR; INTRAVENOUS at 20:03

## 2022-09-01 RX ADMIN — SODIUM CHLORIDE 1000 ML: 9 INJECTION, SOLUTION INTRAVENOUS at 20:15

## 2022-09-01 RX ADMIN — DIPHENHYDRAMINE HYDROCHLORIDE 25 MG: 50 INJECTION, SOLUTION INTRAMUSCULAR; INTRAVENOUS at 20:05

## 2022-09-01 ASSESSMENT — ACTIVITIES OF DAILY LIVING (ADL): ADLS_ACUITY_SCORE: 35

## 2022-09-01 ASSESSMENT — ENCOUNTER SYMPTOMS: FACIAL SWELLING: 1

## 2022-09-02 NOTE — ED TRIAGE NOTES
Pt arrives from home w/ complaint of allergic reaction. Pt reports he was possibly bit by an unknown bug around 1720 on his knee. Reports around 1840 pt began having red, itchy welts throughout his body. Reports he began having lip swelling, facial swelling, and tongue swelling around 1900. Pt has visible angioedema in triage. Reports trouble drinking pta. Pt took 50 mg Benadryl at 1910.

## 2022-09-02 NOTE — ED PROVIDER NOTES
History   Chief Complaint:  Allergic Reaction       The history is provided by the patient.      Jacinto Cruz IV is a 37 year old male who presents with an allergic reaction. He states that he was possibly bit on the knee by an unknown bug around 1720. He began to notice itchy, red welts throughout his body at 1840. As time progresses he is now experiencing lip swelling, facial swelling, and tongue swelling since 1900. He took 50 mg of Benadryl at 1910.    Review of Systems   HENT: Positive for facial swelling.    Skin: Positive for rash.   All other systems reviewed and are negative.    Allergies:  Ibuprofen  Naproxen    Medications:  Flonase  Antivert  Sudafed    Past Medical History:     GERD  Osteoarthritis    Past Surgical History:    Discectomy, left  Left elbow orthopedic surgery     Family History:    Diabetes    Social History:  The patient presents to the ED alone.  PCP: David Valles     Physical Exam     Patient Vitals for the past 24 hrs:   BP Temp Temp src Pulse Resp SpO2 Weight   09/01/22 2200 (!) 143/80 -- -- 80 20 98 % --   09/01/22 2145 133/79 -- -- 80 22 97 % --   09/01/22 2130 -- -- -- 81 19 97 % --   09/01/22 2115 (!) 145/87 -- -- 81 22 98 % --   09/01/22 2100 (!) 151/87 -- -- 82 12 99 % --   09/01/22 2045 (!) 140/93 -- -- 78 24 99 % --   09/01/22 2040 (!) 135/90 -- -- 73 19 100 % --   09/01/22 2035 (!) 135/94 -- -- 67 18 100 % --   09/01/22 2030 (!) 140/88 -- -- 71 22 100 % --   09/01/22 2025 (!) 142/96 -- -- 71 15 100 % --   09/01/22 2020 (!) 151/98 -- -- 71 20 100 % --   09/01/22 2015 (!) 153/105 -- -- 75 24 100 % --   09/01/22 2010 (!) 156/108 -- -- 72 29 100 % --   09/01/22 2005 (!) 130/94 -- -- (!) 48 (!) 42 98 % --   09/01/22 2000 (!) 145/103 -- -- 84 (!) 31 100 % --   09/01/22 1955 -- -- -- -- 18 -- --   09/01/22 1952 (!) 145/97 98.4  F (36.9  C) Temporal 94 -- 99 % 80.5 kg (177 lb 7.5 oz)       Physical Exam  Constitutional:       General: He is not in acute distress.      Appearance: He is not diaphoretic.   HENT:      Head: Atraumatic.      Nose: Nose normal.      Mouth/Throat:      Comments: The lower lip has edema.  The tongue is normal in size.  No edema of the uvula or posterior oropharynx.  No trismus.  No drooling or stridor.  Swallowing normal.  Eyes:      General: No scleral icterus.     Pupils: Pupils are equal, round, and reactive to light.   Cardiovascular:      Rate and Rhythm: Normal rate and regular rhythm.      Heart sounds: Normal heart sounds.   Pulmonary:      Effort: No respiratory distress.      Breath sounds: Normal breath sounds.   Abdominal:      General: Bowel sounds are normal.      Palpations: Abdomen is soft.      Tenderness: There is no abdominal tenderness.   Musculoskeletal:         General: No tenderness.      Cervical back: Neck supple.   Skin:     Capillary Refill: Capillary refill takes less than 2 seconds.      Comments: There is a diffuse, erythematous, maculopapular rash over the face, trunk, and extremities.  No sloughing or blistering.  There are excoriations on the trunk.  The area of concern for the initial bug bite does not demonstrate any raised papule or visible david.  There does not appear to be any retained insect parts.   Neurological:      General: No focal deficit present.      Mental Status: He is oriented to person, place, and time.   Psychiatric:         Mood and Affect: Mood normal.         Behavior: Behavior normal.       Emergency Department Course     Reviewed:  I reviewed nursing notes, vitals, past medical history and Care Everywhere    Assessments:  2000 I obtained history and examined the patient as noted above.   2028 I rechecked the patient and explained findings.     Interventions:  Medications   0.9% sodium chloride BOLUS (0 mLs Intravenous Stopped 9/1/22 2049)     Followed by   sodium chloride 0.9% infusion (has no administration in time range)   lidocaine 1 % 0.1-1 mL (has no administration in time range)   lidocaine  (LMX4) cream (has no administration in time range)   sodium chloride (PF) 0.9% PF flush 3 mL (3 mLs Intracatheter Given 9/1/22 2014)   sodium chloride (PF) 0.9% PF flush 3 mL (has no administration in time range)   diphenhydrAMINE (BENADRYL) injection 25 mg (25 mg Intravenous Given 9/1/22 2005)   famotidine (PEPCID) injection 20 mg (20 mg Intravenous Given 9/1/22 2010)   methylPREDNISolone sodium succinate (solu-MEDROL) injection 125 mg (125 mg Intravenous Given 9/1/22 2003)   EPINEPHrine (ADRENALIN) kit 0.3 mg (0.3 mg Intramuscular Given 9/1/22 1959)     Disposition:  The patient was discharged to home.     Impression & Plan     Medical Decision Making:  This patient has what he presumed to be a bug bite on the left knee.  He was in the woods and felt a sudden sting.  He then began to develop an urticarial type reaction.  On arrival the patient does have angioedema of the lower lip.  He does not have any edema of the posterior oropharynx or airway compromise.  There is a diffuse maculopapular rash.    The patient received 50 mg of oral Benadryl prior to arrival.  On arrival here he received epinephrine, 25 more milligrams of IV Benadryl, Pepcid, and Solu-Medrol.  The lower lip is markedly improved.  The rash has nearly resolved.  The patient is resting comfortably and has been observed for approximately 2-1/2 hours.    The patient is appropriate for outpatient management at this point.  He will return if there is a rebound reaction.  He was prescribed 4 more days of prednisone, an EpiPen to have at home, and antihistamines.    Diagnosis:    ICD-10-CM    1. Allergic reaction, initial encounter  T78.40XA        Discharge Medications:  Discharge Medication List as of 9/1/2022 10:04 PM      START taking these medications    Details   diphenhydrAMINE (BENADRYL) 25 MG capsule Take 1 capsule (25 mg) by mouth every 6 hours as needed for itching or allergies, Disp-30 capsule, R-0, E-Prescribe      EPINEPHrine (ANY BX  GENERIC EQUIV) 0.3 MG/0.3ML injection 2-pack Inject 0.3 mLs (0.3 mg) into the muscle once as needed for anaphylaxis, Disp-1 each, R-0, E-Prescribe      famotidine (PEPCID) 20 MG tablet Take 1 tablet (20 mg) by mouth 2 times daily, Disp-10 tablet, R-0, E-Prescribe      predniSONE (DELTASONE) 20 MG tablet Take two tablets daily for 4 more days., Disp-8 tablet, R-0, E-Prescribe             Scribe Disclosure:  I, Sina Higuera, am serving as a scribe at 7:51 PM on 9/1/2022 to document services personally performed by Gustavo Kendrick MD based on my observations and the provider's statements to me.          Gustavo Kendrick MD  09/01/22 9905

## 2022-09-02 NOTE — ED NOTES
Patient's lip swelling has improved. Facial redness improved. Continues to have hives throughout body. Denies throat swelling or shortness of breathing.

## 2022-09-02 NOTE — DISCHARGE INSTRUCTIONS
Return to the ER for recurrent swelling or rash, difficulty breathing or swallowing, or any new concerns.    You were prescribed an epinephrine pen.  If you are having a severe reaction such as difficulty breathing or significant lightheadedness then you should self administer the epinephrine.

## 2022-09-02 NOTE — ED NOTES
After epinephrine administration patient started to state that he was going to pass out, patient laid flat. Never loss consciousness. Heart rate to 39 beats/min. Provided with oxygen. Pt was also hyperventilating, talked through his breathing. S/o at bedside.    Dr. Kendrick updated on situation. And improvement.

## 2022-10-06 ENCOUNTER — TELEPHONE (OUTPATIENT)
Dept: INTERNAL MEDICINE | Facility: CLINIC | Age: 37
End: 2022-10-06

## 2022-10-06 ENCOUNTER — OFFICE VISIT (OUTPATIENT)
Dept: INTERNAL MEDICINE | Facility: CLINIC | Age: 37
End: 2022-10-06
Payer: COMMERCIAL

## 2022-10-06 VITALS
SYSTOLIC BLOOD PRESSURE: 138 MMHG | TEMPERATURE: 97.3 F | HEART RATE: 78 BPM | OXYGEN SATURATION: 97 % | DIASTOLIC BLOOD PRESSURE: 88 MMHG | BODY MASS INDEX: 26.39 KG/M2 | RESPIRATION RATE: 20 BRPM | WEIGHT: 184.3 LBS | HEIGHT: 70 IN

## 2022-10-06 DIAGNOSIS — Z23 NEED FOR VACCINATION: ICD-10-CM

## 2022-10-06 DIAGNOSIS — Z00.00 ENCOUNTER FOR ROUTINE ADULT HEALTH EXAMINATION WITHOUT ABNORMAL FINDINGS: Primary | ICD-10-CM

## 2022-10-06 DIAGNOSIS — Z23 NEED FOR COVID-19 VACCINE: ICD-10-CM

## 2022-10-06 DIAGNOSIS — J30.9 ALLERGIC RHINITIS, UNSPECIFIED SEASONALITY, UNSPECIFIED TRIGGER: ICD-10-CM

## 2022-10-06 DIAGNOSIS — Z13.6 CARDIOVASCULAR SCREENING; LDL GOAL LESS THAN 130: ICD-10-CM

## 2022-10-06 DIAGNOSIS — Z13.1 SCREENING FOR DIABETES MELLITUS: ICD-10-CM

## 2022-10-06 DIAGNOSIS — R19.7 DIARRHEA, UNSPECIFIED TYPE: ICD-10-CM

## 2022-10-06 DIAGNOSIS — Z11.59 SCREENING FOR VIRAL DISEASE: ICD-10-CM

## 2022-10-06 PROBLEM — R03.0 ELEVATED BLOOD PRESSURE READING WITHOUT DIAGNOSIS OF HYPERTENSION: Status: RESOLVED | Noted: 2017-07-26 | Resolved: 2022-10-06

## 2022-10-06 PROBLEM — G44.219 EPISODIC TENSION-TYPE HEADACHE, NOT INTRACTABLE: Status: RESOLVED | Noted: 2017-07-26 | Resolved: 2022-10-06

## 2022-10-06 LAB
ANION GAP SERPL CALCULATED.3IONS-SCNC: 4 MMOL/L (ref 3–14)
BUN SERPL-MCNC: 17 MG/DL (ref 7–30)
CALCIUM SERPL-MCNC: 9 MG/DL (ref 8.5–10.1)
CHLORIDE BLD-SCNC: 109 MMOL/L (ref 94–109)
CHOLEST SERPL-MCNC: 169 MG/DL
CO2 SERPL-SCNC: 27 MMOL/L (ref 20–32)
CREAT SERPL-MCNC: 1 MG/DL (ref 0.66–1.25)
FASTING STATUS PATIENT QL REPORTED: NO
GFR SERPL CREATININE-BSD FRML MDRD: >90 ML/MIN/1.73M2
GLUCOSE BLD-MCNC: 95 MG/DL (ref 70–99)
HCV AB SERPL QL IA: NONREACTIVE
HDLC SERPL-MCNC: 41 MG/DL
LDLC SERPL CALC-MCNC: 101 MG/DL
NONHDLC SERPL-MCNC: 128 MG/DL
POTASSIUM BLD-SCNC: 4.4 MMOL/L (ref 3.4–5.3)
SODIUM SERPL-SCNC: 140 MMOL/L (ref 133–144)
TRIGL SERPL-MCNC: 135 MG/DL

## 2022-10-06 PROCEDURE — 90714 TD VACC NO PRESV 7 YRS+ IM: CPT | Performed by: INTERNAL MEDICINE

## 2022-10-06 PROCEDURE — 36415 COLL VENOUS BLD VENIPUNCTURE: CPT | Performed by: INTERNAL MEDICINE

## 2022-10-06 PROCEDURE — 90471 IMMUNIZATION ADMIN: CPT | Performed by: INTERNAL MEDICINE

## 2022-10-06 PROCEDURE — 0124A COVID-19,PF,PFIZER BOOSTER BIVALENT: CPT | Performed by: INTERNAL MEDICINE

## 2022-10-06 PROCEDURE — 86803 HEPATITIS C AB TEST: CPT | Performed by: INTERNAL MEDICINE

## 2022-10-06 PROCEDURE — 99395 PREV VISIT EST AGE 18-39: CPT | Mod: 25 | Performed by: INTERNAL MEDICINE

## 2022-10-06 PROCEDURE — 86364 TISS TRNSGLTMNASE EA IG CLAS: CPT | Performed by: INTERNAL MEDICINE

## 2022-10-06 PROCEDURE — 91312 COVID-19,PF,PFIZER BOOSTER BIVALENT: CPT | Performed by: INTERNAL MEDICINE

## 2022-10-06 PROCEDURE — 99213 OFFICE O/P EST LOW 20 MIN: CPT | Mod: 25 | Performed by: INTERNAL MEDICINE

## 2022-10-06 PROCEDURE — 80048 BASIC METABOLIC PNL TOTAL CA: CPT | Performed by: INTERNAL MEDICINE

## 2022-10-06 PROCEDURE — 80061 LIPID PANEL: CPT | Performed by: INTERNAL MEDICINE

## 2022-10-06 ASSESSMENT — ENCOUNTER SYMPTOMS
FEVER: 0
ARTHRALGIAS: 0
EYE PAIN: 0
MYALGIAS: 0
DIARRHEA: 0
SORE THROAT: 0
WEAKNESS: 0
DIZZINESS: 0
NERVOUS/ANXIOUS: 0
HEMATOCHEZIA: 0
ABDOMINAL PAIN: 0
CONSTIPATION: 0
NAUSEA: 0
PALPITATIONS: 0
HEADACHES: 0
CHILLS: 0
SHORTNESS OF BREATH: 0
HEARTBURN: 0
FREQUENCY: 0
DYSURIA: 0
COUGH: 1
PARESTHESIAS: 0
JOINT SWELLING: 0
HEMATURIA: 0

## 2022-10-06 NOTE — TELEPHONE ENCOUNTER
Functional abilities form completed during the OFFICE VISIT.  Paperwork signed and faxed.  Mailed copy to patient and copied to chart.

## 2022-10-06 NOTE — PROGRESS NOTES
SUBJECTIVE:   CC: Jacinto is an 37 year old who presents for preventative health visit.       Patient has been advised of split billing requirements and indicates understanding: Yes  Healthy Habits:     Getting at least 3 servings of Calcium per day:  Yes    Bi-annual eye exam:  NO    Dental care twice a year:  Yes    Sleep apnea or symptoms of sleep apnea:  None    Diet:  Regular (no restrictions)    Frequency of exercise:  4-5 days/week    Duration of exercise:  30-45 minutes    Taking medications regularly:  No    Barriers to taking medications:  Problems remembering to take them    Medication side effects:  None    PHQ-2 Total Score: 0    Additional concerns today:  Yes      Current concerns:  1.  He needs a form completed.  He had done a preemployment physical medical assessment and needs more information regarding fitness to do this work.  He had apparently seen orthopedics for some low back and neck strain and had been put on a lifting restriction.  He saw the orthopedist on October 3 and was given a release to work without any lifting or hourly restrictions.  He brought a copy of that note along with his forms.    2.  He complains he has been having some issues with diarrhea.  This is been for for 5 months.  Most commonly would go 3-5 times a day with soft stool.  Sometimes it can be 7-8 times a day, can have a lot of urgency, can have a little pain or cramping in the lower abdomen and has noted a little bloating.  No fever, chills, nausea, vomiting, blood in the stool.  He was on some antibiotics 5 months ago for a dog bite injury.              Today's PHQ-2 Score:   PHQ-2 ( 1999 [employer]) 10/6/2022   Q1: Little interest or pleasure in doing things 0   Q2: Feeling down, depressed or hopeless 0   PHQ-2 Score 0   Q1: Little interest or pleasure in doing things Not at all   Q2: Feeling down, depressed or hopeless Not at all   PHQ-2 Score 0       Abuse: Current or Past(Physical, Sexual or Emotional)- No  Do you  feel safe in your environment? Yes    Have you ever done Advance Care Planning? (For example, a Health Directive, POLST, or a discussion with a medical provider or your loved ones about your wishes): No, advance care planning information given to patient to review.  Patient declined advance care planning discussion at this time.    Social History     Tobacco Use     Smoking status: Former Smoker     Smokeless tobacco: Former User     Tobacco comment: quit 5/8/14 at 1230   Substance Use Topics     Alcohol use: Yes     Comment: occ     If you drink alcohol do you typically have >3 drinks per day or >7 drinks per week? No    Alcohol Use 10/6/2022   Prescreen: >3 drinks/day or >7 drinks/week? No   Prescreen: >3 drinks/day or >7 drinks/week? -       Last PSA: No results found for: PSA    Reviewed orders with patient. Reviewed health maintenance and updated orders accordingly - Yes      Reviewed and updated as needed this visit by clinical staff   Tobacco  Allergies  Meds   Med Hx  Surg Hx  Fam Hx  Soc Hx          Reviewed and updated as needed this visit by Provider                   Patient Active Problem List   Diagnosis     CARDIOVASCULAR SCREENING; LDL GOAL LESS THAN 130     Allergic rhinitis     Current Outpatient Medications   Medication Sig Dispense Refill     Chlorphen-Phenyleph-Ibuprofen (ADVIL ALLERGY & CONGESTION PO) Take by mouth daily       EPINEPHrine (ANY BX GENERIC EQUIV) 0.3 MG/0.3ML injection 2-pack Inject 0.3 mLs (0.3 mg) into the muscle once as needed for anaphylaxis 1 each 0     fluticasone (FLONASE) 50 MCG/ACT nasal spray Spray 1-2 sprays into both nostrils daily 16 g 0        Review of Systems   Constitutional: Negative for chills and fever.   HENT: Positive for congestion. Negative for ear pain, hearing loss and sore throat.    Eyes: Negative for pain and visual disturbance.   Respiratory: Positive for cough. Negative for shortness of breath.    Cardiovascular: Negative for chest pain,  "palpitations and peripheral edema.   Gastrointestinal: Negative for abdominal pain, constipation, diarrhea, heartburn, hematochezia and nausea.   Genitourinary: Negative for dysuria, frequency, genital sores, hematuria, impotence, penile discharge and urgency.   Musculoskeletal: Negative for arthralgias, joint swelling and myalgias.   Skin: Negative for rash.   Neurological: Negative for dizziness, weakness, headaches and paresthesias.   Psychiatric/Behavioral: Negative for mood changes. The patient is not nervous/anxious.    He has some chronic allergic rhinitis, uses Flonase, will occasionally take Sudafed or antihistamines.  Cough is usually related to the allergic symptoms.  He been in the ED with a bug bite reaction, no further episodes, does have an EpiPen now..      OBJECTIVE:   /88   Pulse 78   Temp 97.3  F (36.3  C) (Tympanic)   Resp 20   Ht 1.778 m (5' 10\")   Wt 83.6 kg (184 lb 4.8 oz)   SpO2 97%   BMI 26.44 kg/m      Physical Exam  HEENT: extraocular movements are intact, pupils equal and reactive to light and accommodation, TMs clear  NECK: Neck supple. No adenopathy. Thyroid symmetric, normal size,  PULMONARY: clear to auscultation  CARDIAC: S1, S2 normal, no murmur, rub or gallop, regular rate and rhythm  PULSES: 2/2 throughout  ABDOMINAL: Soft, nontender.  Normal bowel sounds.  No hepatosplenomegaly or abnormal masses  REFLEXES: 2+ throughout    ASSESSMENT/PLAN:   (Z00.00) Encounter for routine adult health examination without abnormal findings  (primary encounter diagnosis)  Comment: Given tetanus booster and COVID booster, encouraged him to do flu shot later  Plan: Completed forms    (R19.7) Diarrhea, unspecified type  Comment: We will check some labs and stool test, consider referral to GI  Plan: Tissue transglutaminase sherrno IgA and IgG, Ova         and Parasite Exam Routine, Clostridium         difficile Toxin B PCR, Enteric Bacteria and         Virus Panel by FRIDA Stool, " "Cryptosporidium in         stool, stain            (Z13.1) Screening for diabetes mellitus  Comment:   Plan: Basic metabolic panel  (Ca, Cl, CO2, Creat,         Gluc, K, Na, BUN)            (Z11.59) Screening for viral disease  Comment:   Plan: Hepatitis C Screen Reflex to HCV RNA Quant and         Genotype            (Z13.6) CARDIOVASCULAR SCREENING; LDL GOAL LESS THAN 130  Comment:   Plan: Lipid panel reflex to direct LDL Non-fasting            (Z23) Need for COVID-19 vaccine  Comment:   Plan: COVID-19,PF,PFIZER BOOSTER BIVALENT            (Z23) Need for vaccination  Comment:   Plan: TD PRSERV FREE >=7 YRS ADS IM [[id number]]              Patient has been advised of split billing requirements and indicates understanding: Yes    COUNSELING:   Reviewed preventive health counseling, as reflected in patient instructions    Estimated body mass index is 26.44 kg/m  as calculated from the following:    Height as of this encounter: 1.778 m (5' 10\").    Weight as of this encounter: 83.6 kg (184 lb 4.8 oz).     Weight management plan: Discussed healthy diet and exercise guidelines    He reports that he has quit smoking. He has quit using smokeless tobacco.      Counseling Resources:  ATP IV Guidelines  Pooled Cohorts Equation Calculator  FRAX Risk Assessment  ICSI Preventive Guidelines  Dietary Guidelines for Americans, 2010  USDA's MyPlate  ASA Prophylaxis  Lung CA Screening    Uyen Serrano MD  [facility]  "

## 2022-10-09 PROCEDURE — 87506 IADNA-DNA/RNA PROBE TQ 6-11: CPT | Performed by: INTERNAL MEDICINE

## 2022-10-09 PROCEDURE — 87209 SMEAR COMPLEX STAIN: CPT | Performed by: INTERNAL MEDICINE

## 2022-10-09 PROCEDURE — 87493 C DIFF AMPLIFIED PROBE: CPT | Mod: 59 | Performed by: INTERNAL MEDICINE

## 2022-10-09 PROCEDURE — 87177 OVA AND PARASITES SMEARS: CPT | Performed by: INTERNAL MEDICINE

## 2022-10-10 LAB — C DIFF TOX B STL QL: NEGATIVE

## 2022-10-11 LAB
C CAYETANENSIS SPEC QL: NORMAL
C COLI+JEJUNI+LARI FUSA STL QL NAA+PROBE: NOT DETECTED
CRYPTOSP STL QL ACID FAST STN: NORMAL
CYSTOISOSPORA BELLI: NORMAL
EC STX1 GENE STL QL NAA+PROBE: NOT DETECTED
EC STX2 GENE STL QL NAA+PROBE: NOT DETECTED
NOROV GI+II ORF1-ORF2 JNC STL QL NAA+PR: NOT DETECTED
O+P STL MICRO: NEGATIVE
RVA NSP5 STL QL NAA+PROBE: NOT DETECTED
SALMONELLA SP RPOD STL QL NAA+PROBE: NOT DETECTED
SHIGELLA SP+EIEC IPAH STL QL NAA+PROBE: NOT DETECTED
TTG IGA SER-ACNC: 1 U/ML
TTG IGG SER-ACNC: <0.6 U/ML
V CHOL+PARA RFBL+TRKH+TNAA STL QL NAA+PR: NOT DETECTED
Y ENTERO RECN STL QL NAA+PROBE: NOT DETECTED

## 2022-10-16 ENCOUNTER — HEALTH MAINTENANCE LETTER (OUTPATIENT)
Age: 37
End: 2022-10-16

## 2022-10-30 ENCOUNTER — HOSPITAL ENCOUNTER (EMERGENCY)
Facility: CLINIC | Age: 37
Discharge: HOME | End: 2022-10-30
Attending: EMERGENCY MEDICINE | Admitting: EMERGENCY MEDICINE
Payer: COMMERCIAL

## 2022-10-30 VITALS
OXYGEN SATURATION: 100 % | RESPIRATION RATE: 20 BRPM | TEMPERATURE: 98.8 F | DIASTOLIC BLOOD PRESSURE: 106 MMHG | SYSTOLIC BLOOD PRESSURE: 147 MMHG | HEART RATE: 68 BPM

## 2022-10-30 DIAGNOSIS — S05.02XA ABRASION OF LEFT CORNEA, INITIAL ENCOUNTER: ICD-10-CM

## 2022-10-30 PROCEDURE — 99283 EMERGENCY DEPT VISIT LOW MDM: CPT

## 2022-10-30 RX ORDER — ERYTHROMYCIN 5 MG/G
0.5 OINTMENT OPHTHALMIC 4 TIMES DAILY
Qty: 4 G | Refills: 0 | Status: SHIPPED | OUTPATIENT
Start: 2022-10-30

## 2022-10-31 NOTE — ED PROVIDER NOTES
Visit Date: 10/30/2022    CHIEF COMPLAINT:  Left eye injury.    HISTORY OF PRESENT ILLNESS:  This is a 37-year-old gentleman who was mowing the lawn earlier today.  A tree branch brushed against his left eye.  He has had blurry vision and pain since that time.  No other injuries.    PAST MEDICAL HISTORY:  GERD.    PAST SURGICAL HISTORY:    1.  Lumbar diskectomy.  2.  Left elbow ORIF.    MEDICATIONS:   1.  Epinephrine as needed.  2.  Flonase.    ALLERGIES:    1.  IBUPROFEN.  2.  NAPROXEN.    SOCIAL HISTORY:  The patient presents to the Emergency Department by himself.  He is a former smoker.    REVIEW OF SYSTEMS:  Pertinent 10-point review of systems is negative except for that noted in the HPI.    PHYSICAL EXAMINATION:    GENERAL:  This is a pleasant 37-year-old gentleman who presents with his left eye closed.  Otherwise, appropriate with interaction.  VITAL SIGNS:  Blood pressure 147/106, heart rate 68, respiratory rate 20, oxygen 100% on room air, temperature 98.8 degrees.  HEENT:  Atraumatic.    Eyes:  Pupils equal, round, react to light.  Extraocular movements intact.  No hyphema.  There is clear tearing from the left eye.  No purulent drainage.  No foreign bodies identified.  On fluorescein staining, there is a linear, approximately 2 to 3 mm in length, corneal abrasion overlying the mid pupil.  No ulcerations or other injuries.  CARDIOVASCULAR:  Regular rhythm. Normal rate.  No murmurs.  RESPIRATORY:  Clear to auscultation bilaterally with normal effort.  SKIN:  No pertinent skin findings.  NEUROLOGIC:  The patient is alert and oriented x 4.    LABORATORY DATA AND DIAGNOSTIC STUDIES:  None.    EMERGENCY DEPARTMENT COURSE AND MEDICAL DECISION MAKING:  This is a 37-year-old gentleman who presents with left-sided corneal abrasion.  No evidence of globe rupture or ulceration.  The abrasion is directly over his pupil, which explains the slightly blurry vision.  Care will be supportive with erythromycin eye ointment  and eye clinic followup if symptoms persist more than several days.    DIAGNOSES:  Left corneal abrasion.    PLAN OF CARE:  As above.    Mikey Summers MD        D: 10/30/2022   T: 10/31/2022   MT: NATY    Name:     EDILIA SKINNER  MRN:      [id number]        Account:    [id number]   :      1985           Visit Date: 10/30/2022     Document: [id number]

## 2022-10-31 NOTE — ED TRIAGE NOTES
"Pt aox4, ABCs intact. Pt was mowing the lawn around 1300 today when he walked into a tree branch. Patient now having a \"line of blurry vision\" since 1300. Pain is increasing, no improvement with eyedrops.      "

## 2022-11-23 ENCOUNTER — TELEPHONE (OUTPATIENT)
Dept: INTERNAL MEDICINE | Facility: CLINIC | Age: 37
End: 2022-11-23

## 2022-11-23 NOTE — TELEPHONE ENCOUNTER
FYI - Status Update    Who is Calling: patient    Update: pt was seen by Uyen Serrano in October for a yearly physical and needs a form filled out by provider for work reasons , pt does need form filled out before Monday and also needs to take form with so wouldn't be able to drop form off would need to wait for it to be filled out     Does caller want a call/response back: Yes     Could we send this information to you in [employer] or would you prefer to receive a phone call?:   Patient would prefer a phone call   Okay to leave a detailed message?: Yes at Cell number on file:    Telephone Information:   Mobile [phone]

## 2022-11-29 NOTE — TELEPHONE ENCOUNTER
Patient dropped off this form at our . Form in your mailbox to be signed.    He wants to note that he is taking gabapentin 300mg TID prescribed by a work comp provider.

## 2022-12-18 ENCOUNTER — NURSE TRIAGE (OUTPATIENT)
Dept: NURSING | Facility: CLINIC | Age: 37
End: 2022-12-18

## 2022-12-18 ENCOUNTER — OFFICE VISIT (OUTPATIENT)
Dept: URGENT CARE | Facility: URGENT CARE | Age: 37
End: 2022-12-18

## 2022-12-18 VITALS
SYSTOLIC BLOOD PRESSURE: 138 MMHG | BODY MASS INDEX: 26.4 KG/M2 | DIASTOLIC BLOOD PRESSURE: 82 MMHG | TEMPERATURE: 98.4 F | HEART RATE: 71 BPM | WEIGHT: 184 LBS | OXYGEN SATURATION: 98 % | RESPIRATION RATE: 14 BRPM

## 2022-12-18 DIAGNOSIS — L08.9 TOE INFECTION: ICD-10-CM

## 2022-12-18 DIAGNOSIS — M79.675 PAIN OF TOE OF LEFT FOOT: Primary | ICD-10-CM

## 2022-12-18 LAB — URATE SERPL-MCNC: 5.1 MG/DL (ref 3.4–7)

## 2022-12-18 PROCEDURE — 99214 OFFICE O/P EST MOD 30 MIN: CPT | Performed by: FAMILY MEDICINE

## 2022-12-18 PROCEDURE — 36415 COLL VENOUS BLD VENIPUNCTURE: CPT | Performed by: FAMILY MEDICINE

## 2022-12-18 PROCEDURE — 84550 ASSAY OF BLOOD/URIC ACID: CPT | Performed by: FAMILY MEDICINE

## 2022-12-18 RX ORDER — GABAPENTIN 300 MG/1
CAPSULE ORAL
COMMUNITY
Start: 2022-11-01

## 2022-12-18 RX ORDER — IBUPROFEN 200 MG
200-400 TABLET ORAL
COMMUNITY

## 2022-12-18 RX ORDER — CEPHALEXIN 500 MG/1
500 CAPSULE ORAL 4 TIMES DAILY
Qty: 40 CAPSULE | Refills: 0 | Status: SHIPPED | OUTPATIENT
Start: 2022-12-18 | End: 2022-12-28

## 2022-12-18 RX ORDER — GUAIFENESIN 600 MG/1
1200 TABLET, EXTENDED RELEASE ORAL 2 TIMES DAILY
COMMUNITY

## 2022-12-18 RX ORDER — PREDNISONE 20 MG/1
40 TABLET ORAL DAILY
Qty: 10 TABLET | Refills: 0 | Status: SHIPPED | OUTPATIENT
Start: 2022-12-18 | End: 2022-12-23

## 2022-12-18 NOTE — TELEPHONE ENCOUNTER
Questions about what time to take his new antibiotic and steroid.     Gladis Vance RN on 12/18/2022 at 3:33 PM  Reason for Disposition    Health Information question, no triage required and triager able to answer question    Protocols used: INFORMATION ONLY CALL - NO TRIAGE-A-

## 2022-12-18 NOTE — PROGRESS NOTES
SUBJECTIVE:  Chief Complaint   Patient presents with     Pain     Left toe pain x Frlday AM -- not sure how it happened -- took some Advil, he does run and broke his toenail about 1 month ago     Jacinto Cruz IV is a 37 year old male who presents with a chief complaint of left toe pain X3 days.    Sustained toenail injury about 1 month ago, cracked due to hitting something.  Did try to clip toenail, noticed that was digging into toe more so did try to dig the nail out.    Notice small amount of redness on Friday, worsening that evening.  Unable to walk due to pain.  Pain radiates upwards to foot and knee.  No prior gout    Past Medical History:   Diagnosis Date     Gastro-oesophageal reflux disease      Current Outpatient Medications   Medication Sig Dispense Refill     EPINEPHrine (ANY BX GENERIC EQUIV) 0.3 MG/0.3ML injection 2-pack Inject 0.3 mLs (0.3 mg) into the muscle once as needed for anaphylaxis 1 each 0     erythromycin (ROMYCIN) 5 MG/GM ophthalmic ointment Place 0.5 inches Into the left eye 4 times daily 4 g 0     fluticasone (FLONASE) 50 MCG/ACT nasal spray Spray 1-2 sprays into both nostrils daily 16 g 0     gabapentin (NEURONTIN) 300 MG capsule        guaiFENesin (MUCINEX) 600 MG 12 hr tablet Take 1,200 mg by mouth 2 times daily       ibuprofen (ADVIL/MOTRIN) 200 MG tablet Take 200-400 mg by mouth       erythromycin (ROMYCIN) 5 MG/GM ophthalmic ointment Place 0.5 inches Into the left eye 4 times daily (Patient not taking: Reported on 12/18/2022) 4 g 0     Social History     Tobacco Use     Smoking status: Former     Smokeless tobacco: Former     Tobacco comments:     quit 5/8/14 at 1230   Substance Use Topics     Alcohol use: Yes     Comment: occ       ROS:  Review of systems negative except as stated above.    EXAM:   /82 (BP Location: Right arm, Patient Position: Chair, Cuff Size: Adult Regular)   Pulse 71   Temp 98.4  F (36.9  C) (Oral)   Resp 14   Wt 83.5 kg (184 lb)   SpO2 98%   BMI  26.40 kg/m    M/S Exam:left foot - big toe with tenderness, mild swelling diffusely, more prominent around nail, erythema and medial edge of toenail digging into skin, more swelling on medial edge, decrease ROM  GENERAL APPEARANCE: healthy, alert and no distress  EXTREMITIES: peripheral pulses normal  PSYCH: alert, affect bright    X-RAY was not done.    ASSESSMENT/PLAN:  (M79.675) Pain of toe of left foot  (primary encounter diagnosis)  Comment: left big toe  Plan: Uric acid, predniSONE (DELTASONE) 20 MG tablet,        Orthopedic  Referral            (L08.9) Toe infection  Comment: left big toe  Plan: cephALEXin (KEFLEX) 500 MG capsule            Reviewed that most likely has toe infection from ingrown toenail, RX keflex given for treatment and encourage to soak foot, tylenol and sparingly use ibuprofen.  Referral placed for podiatry follow up to determine if needs partial toenail removal.    Reviewed possible gout as pain is more intense, will obtain uric acid lab test and reviewed that if positive that this confirms gout.  RX prednisone burst given to take if positive for gout    Follow up with Podiatry in 1 week  Recommend to follow up with primary provider in 1-2 weeks if not improving      Colton Degroot MD  December 18, 2022 12:37 PM

## 2022-12-18 NOTE — PATIENT INSTRUCTIONS
Take keflex for toe infection    If uric acid level is elevated, this confirms gout and you can take prednisone burst    Follow up with Podiatry for toe pain, consideration for partial toenail removal

## 2023-08-05 ENCOUNTER — HOSPITAL ENCOUNTER (EMERGENCY)
Facility: HOSPITAL | Age: 38
Discharge: HOME | End: 2023-08-05
Attending: EMERGENCY MEDICINE | Admitting: EMERGENCY MEDICINE
Payer: OTHER MISCELLANEOUS

## 2023-08-05 ENCOUNTER — NURSE TRIAGE (OUTPATIENT)
Dept: NURSING | Facility: CLINIC | Age: 38
End: 2023-08-05

## 2023-08-05 VITALS
RESPIRATION RATE: 18 BRPM | WEIGHT: 180 LBS | HEIGHT: 71 IN | HEART RATE: 75 BPM | DIASTOLIC BLOOD PRESSURE: 72 MMHG | OXYGEN SATURATION: 98 % | TEMPERATURE: 98.9 F | BODY MASS INDEX: 25.2 KG/M2 | SYSTOLIC BLOOD PRESSURE: 139 MMHG

## 2023-08-05 DIAGNOSIS — T63.481A INSECT STINGS, ACCIDENTAL OR UNINTENTIONAL, INITIAL ENCOUNTER: ICD-10-CM

## 2023-08-05 DIAGNOSIS — Z02.6 ENCOUNTER FOR ASSESSMENT OF WORK-RELATED CAUSATION OF INJURY: ICD-10-CM

## 2023-08-05 DIAGNOSIS — T44.5X5A: ICD-10-CM

## 2023-08-05 PROCEDURE — 250N000009 HC RX 250: Performed by: EMERGENCY MEDICINE

## 2023-08-05 PROCEDURE — 250N000011 HC RX IP 250 OP 636: Mod: JZ | Performed by: EMERGENCY MEDICINE

## 2023-08-05 PROCEDURE — 96374 THER/PROPH/DIAG INJ IV PUSH: CPT

## 2023-08-05 PROCEDURE — 99285 EMERGENCY DEPT VISIT HI MDM: CPT | Mod: 25

## 2023-08-05 PROCEDURE — 96375 TX/PRO/DX INJ NEW DRUG ADDON: CPT

## 2023-08-05 PROCEDURE — 250N000012 HC RX MED GY IP 250 OP 636 PS 637: Performed by: EMERGENCY MEDICINE

## 2023-08-05 RX ORDER — FAMOTIDINE 20 MG/1
20 TABLET, FILM COATED ORAL 2 TIMES DAILY
Qty: 10 TABLET | Refills: 0 | Status: SHIPPED | OUTPATIENT
Start: 2023-08-05 | End: 2023-08-10

## 2023-08-05 RX ORDER — EPINEPHRINE 0.3 MG/.3ML
0.3 INJECTION SUBCUTANEOUS
Qty: 2 EACH | Refills: 0 | Status: SHIPPED | OUTPATIENT
Start: 2023-08-05

## 2023-08-05 RX ORDER — PREDNISONE 20 MG/1
40 TABLET ORAL DAILY
Qty: 8 TABLET | Refills: 0 | Status: SHIPPED | OUTPATIENT
Start: 2023-08-05 | End: 2023-08-09

## 2023-08-05 RX ORDER — PREDNISONE 20 MG/1
40 TABLET ORAL ONCE
Status: COMPLETED | OUTPATIENT
Start: 2023-08-05 | End: 2023-08-05

## 2023-08-05 RX ORDER — METHYLPREDNISOLONE SODIUM SUCCINATE 125 MG/2ML
125 INJECTION, POWDER, LYOPHILIZED, FOR SOLUTION INTRAMUSCULAR; INTRAVENOUS ONCE
Status: COMPLETED | OUTPATIENT
Start: 2023-08-05 | End: 2023-08-05

## 2023-08-05 RX ADMIN — EPINEPHRINE 0.3 MG: 1 INJECTION INTRAMUSCULAR; INTRAVENOUS; SUBCUTANEOUS at 14:13

## 2023-08-05 RX ADMIN — METHYLPREDNISOLONE SODIUM SUCCINATE 125 MG: 125 INJECTION, POWDER, FOR SOLUTION INTRAMUSCULAR; INTRAVENOUS at 14:15

## 2023-08-05 RX ADMIN — FAMOTIDINE 20 MG: 10 INJECTION, SOLUTION INTRAVENOUS at 14:23

## 2023-08-05 RX ADMIN — PREDNISONE 40 MG: 20 TABLET ORAL at 17:11

## 2023-08-05 ASSESSMENT — ACTIVITIES OF DAILY LIVING (ADL)
ADLS_ACUITY_SCORE: 35
ADLS_ACUITY_SCORE: 35

## 2023-08-05 NOTE — ED PROVIDER NOTES
Emergency Department Encounter     Evaluation Date & Time:   2023  1:47 PM    CHIEF COMPLAINT:  Insect Bite      Triage Note:Pt was stung by bee to RLE at 1236 today. Pt then gave self epipen about ten mins after and accidentally injected into left thumb. Thumb is pale in color and feels cold. Hx of allergic reaction to bees and that is why he had epipen. Pt feels like throat is scratchy and tight. Denies sob, n/v, or hives.     Pt is talking in full sentences and is not in acute distress in triage.           Impression and Plan       FINAL IMPRESSION:    ICD-10-CM    1. Insect stings, accidental or unintentional, initial encounter  T63.481A       2. Adverse reaction to epinephrine, initial encounter  T44.5X5A       3. Encounter for assessment of work-related causation of injury  Z02.6             ED COURSE & MEDICAL DECISION MAKIN:00 PM I met with the patient to gather history and to perform my initial exam. We discussed   plans for the ED course, including diagnostic testing and treatment.    3:37 PM Rechecked patient. Thumb is improving - color and temperature improved. Swelling LLE resolved and no shortness of breath.       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 He attempted to administer his EpiPen, but accidentally injected himself in the pad of his left thumb. Since, his thumb has been pale, cool and numb with no associated weakness. He reports some tightness in his throat with some difficulty swallowing and slight shortness of breath. His neck was itching after the sting, but he denies hives and flushing.     On exam, he has clear breath sounds with normal posterior oropharynx.    Given report of some difficulty swallowing and slight shortness of breath, patient given 0.3mg IM epinephrine as well as 125mg IV Solu-Medrol and 20 mg IV famotidine.    Left thumb  appears mildly pale and is cool to touch with normal strength. Thumb was warm packed with gradual and progressive improvement. He reports ongoing paresthesias, but overall improvement - anticipate full resolution with a little more time.    Patient monitored without recurrence of allergic symptoms and thumb continued to improve. Patient discharged to home with follow-up [facility], per his employer. He was given prescriptions for an EpiPen, prednisone burst and famotidine.  Return precautions provided.  Patient stable throughout ED course.          At the conclusion of the encounter I discussed the results of all the tests and the disposition. The questions were answered. The patient acknowledged understanding and was agreeable with the care plan.      Medical Decision Making    History:    Supplemental history from: Documented in chart, if applicable    External Record(s) reviewed: Documented in chart, if applicable.    Work Up:    Chart documentation includes differential considered and any EKGs or imaging independently interpreted by provider, where specified.    In additional to work up documented, I considered the following work up: Documented in chart, if applicable.    External consultation:    Discussion of management with another provider: Documented in chart, if applicable    Complicating factors:    Care impacted by chronic illness: N/A    Care affected by social determinants of health: N/A    Disposition considerations: Discharge. I prescribed additional prescription strength medication(s) as charted. I considered admission, but ultimately discharged patient given clinical improvement.        MEDICATIONS GIVEN IN THE EMERGENCY DEPARTMENT:  Medications   EPINEPHrine (ADRENALIN) kit 0.3 mg (0.3 mg Intramuscular $Given 8/5/23 1413)   methylPREDNISolone sodium succinate (solu-MEDROL) injection 125 mg (125 mg Intravenous $Given 8/5/23 1415)   famotidine (PEPCID) injection 20 mg (20 mg  Intravenous $Given 8/5/23 1423)   predniSONE (DELTASONE) tablet 40 mg (40 mg Oral $Given 8/5/23 1711)       NEW PRESCRIPTIONS STARTED AT TODAY'S ED VISIT:  Discharge Medication List as of 8/5/2023  5:12 PM        START taking these medications    Details   !! EPINEPHrine (ANY BX GENERIC EQUIV) 0.3 MG/0.3ML injection 2-pack Inject 0.3 mLs (0.3 mg) into the muscle once as needed for anaphylaxis or other (severe allergic reaction) May repeat one time in 5-15 minutes if response to initial dose is inadequate., Disp-2 each, R-0, Local Print      famotidine (PEPCID) 20 MG tablet Take 1 tablet (20 mg) by mouth 2 times daily for 5 days, Disp-10 tablet, R-0, Local Print      predniSONE (DELTASONE) 20 MG tablet Take 2 tablets (40 mg) by mouth daily for 4 doses, Disp-8 tablet, R-0, Local Print       !! - Potential duplicate medications found. Please discuss with provider.          HPI     The history is provided by the patient. No  was used.        Jacinto Cruz IV is a 38 year old male, history of known allergy to insect stings and otherwise healthy, who presents to this ED by private car for evaluation after an insect bite.    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 He carries an EpiPen with him given his known allergy and when he attempted to administer it, he accidentally injected himself in the pad of his left thumb. Since, his thumb has been pale, cool and numb with no associated weakness. He reports some tightness in his throat with some difficulty swallowing and slight shortness of breath. His neck was itching after the sting, but he denies hives and flushing.     He has otherwise been in his usual state of health and denies abdominal pain, N/V/D, cough, fever or other concerns.     REVIEW OF SYSTEMS:  All other systems reviewed and are negative.      Medical History     Past Medical  History:   Diagnosis Date     Gastro-oesophageal reflux disease        Past Surgical History:   Procedure Laterality Date     DISCECTOMY LUMBAR POSTERIOR MICROSCOPIC ONE LEVEL Left 3/16/2015    Procedure: DISCECTOMY LUMBAR POSTERIOR MICROSCOPIC ONE LEVEL;  Surgeon: Gm Griggs MD;  Location: [facility] OR     ORTHOPEDIC SURGERY      left elbow orif       Family History   Problem Relation Age of Onset     Diabetes Sister        Social History     Tobacco Use     Smoking status: Former     Smokeless tobacco: Former     Tobacco comments:     quit 5/8/14 at 1230   Vaping Use     Vaping Use: Never used   Substance Use Topics     Alcohol use: Yes     Comment: occ     Drug use: No       EPINEPHrine (ANY BX GENERIC EQUIV) 0.3 MG/0.3ML injection 2-pack  famotidine (PEPCID) 20 MG tablet  predniSONE (DELTASONE) 20 MG tablet  EPINEPHrine (ANY BX GENERIC EQUIV) 0.3 MG/0.3ML injection 2-pack  erythromycin (ROMYCIN) 5 MG/GM ophthalmic ointment  erythromycin (ROMYCIN) 5 MG/GM ophthalmic ointment  fluticasone (FLONASE) 50 MCG/ACT nasal spray  gabapentin (NEURONTIN) 300 MG capsule  guaiFENesin (MUCINEX) 600 MG 12 hr tablet  ibuprofen (ADVIL/MOTRIN) 200 MG tablet        Physical Exam     First Vitals:  Patient Vitals for the past 24 hrs:   BP Temp Temp src Pulse Resp SpO2 Height Weight   08/05/23 1705 139/72 -- -- -- -- -- -- --   08/05/23 1635 134/85 -- -- 75 -- 98 % -- --   08/05/23 1605 133/81 -- -- 68 -- 98 % -- --   08/05/23 1535 132/81 -- -- 71 -- 97 % -- --   08/05/23 1513 136/88 -- -- 79 -- 99 % -- --   08/05/23 1503 130/81 -- -- 80 -- 99 % -- --   08/05/23 1443 123/76 -- -- 69 -- 99 % -- --   08/05/23 1433 133/77 -- -- 68 -- 99 % -- --   08/05/23 1430 133/86 -- -- 70 -- 99 % -- --   08/05/23 1427 -- -- -- 64 -- 99 % -- --   08/05/23 1412 -- -- -- 65 -- 98 % -- --   08/05/23 1401 -- -- -- 67 -- 98 % -- --   08/05/23 1357 -- -- -- 69 -- 99 % -- --   08/05/23 1351 (!) 150/86 -- -- -- -- -- -- --   08/05/23 1347 -- -- -- -- -- -- 1.791  "m (5' 10.5\") 81.6 kg (180 lb)   08/05/23 1345 -- 98.9  F (37.2  C) Temporal -- -- -- -- --   08/05/23 1342 (!) 137/94 -- -- 84 18 98 % -- --       PHYSICAL EXAM:   Physical Exam    GENERAL: Awake, alert.  In no acute distress.   HEENT: Normocephalic, atraumatic.  Normal posterior oropharynx without swelling or edema.  NECK: No stridor.  PULMONARY: Symmetrical breath sounds without distress.  Lungs clear to auscultation bilaterally without wheezes, rhonchi or rales.  CARDIO: Regular rate and rhythm.  No significant murmur, rub or gallop.    ABDOMINAL: Abdomen soft, non-distended and non-tender to palpation.    EXTREMITIES: There is localized swelling about the left ankle where he was stung - no palpable or visible stinger. Left thumb appears mildly pale and is cool to touch with normal strength.       NEURO: Alert and oriented to person, place and time.  Cranial nerves grossly intact.  No focal motor deficit.  PSYCH: Normal mood and affect.  SKIN: No urticaria.       I, Orly Bruno , am serving as a scribe to document services personally performed by Mery Chávez MD based on my observation and the provider's statements to me. I, Mery Chávez MD attest that Orly Bruno  is acting in a scribe capacity, has observed my performance of the services and has documented them in accordance with my direction.    Mery Chávez MD  Emergency Medicine  [facility] EMERGENCY DEPARTMENT           Mery Chávez MD  08/06/23 1100    "

## 2023-08-05 NOTE — ED TRIAGE NOTES
Pt was stung by bee to RLE at 1236 today. Pt then gave self epipen about ten mins after and accidentally injected into left thumb. Thumb is pale in color and feels cold. Hx of allergic reaction to bees and that is why he had epipen. Pt feels like throat is scratchy and tight. Denies sob, n/v, or hives.     Pt is talking in full sentences and is not in acute distress in triage.

## 2023-08-05 NOTE — TELEPHONE ENCOUNTER
Nurse Triage SBAR    Is this a 2nd Level Triage? NO    Situation: Patient was stung by bee above ankle, unsure if he has anaphylactic reaction but has been in for bite at one time and given Epi pen for tightness in chest and shortness of breathing after bite or sting      Assessment: Patient stung by bee at 12:40 site started to swell and get red right away, has Epi pen with him and accidentally stuck himself in the thumb and has no more with him. States he is feeling somewhat tingly and slightly tight in his throat    Protocol Recommended Disposition:   Call  Now Writer told patient to call 911 to be evaluated by EMS     Rhona Ridley RN on 8/5/2023 at 1:13 PM                  Reason for Disposition   [1] Tightness in the throat or chest AND [2] sudden onset following sting    Additional Information   Negative: [1] Life-threatening reaction (anaphylaxis) in the past to sting AND [2] < 2 hours since sting   Negative: Attacked by swarm of bees now   Negative: Passed out (i.e., lost consciousness, collapsed and was not responding)   Negative: Wheezing, stridor, or difficulty breathing   Negative: [1] Hoarseness or cough AND [2] sudden onset following sting    Protocols used: Bee or Yellow Jacket Sting-A-

## 2023-08-05 NOTE — DISCHARGE INSTRUCTIONS
Please follow-up with the [facility] this upcoming week, per your employer.    Return to the ER for signs or symptoms of allergic reaction (itching, hives, shortness of breath, difficulty swallowing, the sensation that your throat is swelling), recurrent pallor of your thumb, fever or other concerns.    Carry an EpiPen with you at all times and use if you have severe allergic reaction.

## 2023-08-06 ENCOUNTER — NURSE TRIAGE (OUTPATIENT)
Dept: NURSING | Facility: CLINIC | Age: 38
End: 2023-08-06

## 2023-08-06 NOTE — TELEPHONE ENCOUNTER
Received call from patient stating that was seen in ED yesterday for a bee sting. States this is a Workman's Comp case as it happened at work. States he was given paper prescriptions and he couldn't get them filled at the pharmacy in [place]. He was told he would have to pay out-of-pocket, and he won't get reimbursed. Is asking for a Workman's Comp form that needs to be filled out. Advised patient to try different pharmacies to see if they will fill those meds through his Workman's Comp. Recommended checking with the ED for the form that needs to be filled out, or with his clinic.    Reason for Disposition   [1] Caller requesting NON-URGENT health information AND [2] PCP's office is the best resource    Additional Information   Negative: [1] Caller is not with the adult (patient) AND [2] reporting urgent symptoms   Negative: Lab result questions   Negative: Medication questions   Negative: Caller can't be reached by phone   Negative: Caller has already spoken to PCP or another triager   Negative: RN needs further essential information from caller in order to complete triage   Negative: Requesting regular office appointment    Protocols used: Information Only Call - No Triage-A-

## 2023-08-10 ENCOUNTER — HOSPITAL ENCOUNTER (EMERGENCY)
Facility: HOSPITAL | Age: 38
Discharge: HOME | End: 2023-08-10
Attending: EMERGENCY MEDICINE | Admitting: EMERGENCY MEDICINE
Payer: OTHER MISCELLANEOUS

## 2023-08-10 VITALS
HEIGHT: 71 IN | OXYGEN SATURATION: 99 % | DIASTOLIC BLOOD PRESSURE: 95 MMHG | WEIGHT: 180 LBS | RESPIRATION RATE: 18 BRPM | BODY MASS INDEX: 25.2 KG/M2 | HEART RATE: 74 BPM | SYSTOLIC BLOOD PRESSURE: 146 MMHG | TEMPERATURE: 98.2 F

## 2023-08-10 DIAGNOSIS — T63.441A BEE STING, ACCIDENTAL OR UNINTENTIONAL, INITIAL ENCOUNTER: ICD-10-CM

## 2023-08-10 DIAGNOSIS — T78.40XA ALLERGIC REACTION, INITIAL ENCOUNTER: ICD-10-CM

## 2023-08-10 PROCEDURE — 250N000011 HC RX IP 250 OP 636: Mod: JZ | Performed by: EMERGENCY MEDICINE

## 2023-08-10 PROCEDURE — 96375 TX/PRO/DX INJ NEW DRUG ADDON: CPT

## 2023-08-10 PROCEDURE — 99285 EMERGENCY DEPT VISIT HI MDM: CPT | Mod: 25

## 2023-08-10 PROCEDURE — 96374 THER/PROPH/DIAG INJ IV PUSH: CPT

## 2023-08-10 PROCEDURE — 250N000009 HC RX 250: Performed by: EMERGENCY MEDICINE

## 2023-08-10 RX ORDER — HYDROXYZINE HYDROCHLORIDE 25 MG/1
TABLET, FILM COATED ORAL
Qty: 20 TABLET | Refills: 0 | Status: SHIPPED | OUTPATIENT
Start: 2023-08-10

## 2023-08-10 RX ORDER — FAMOTIDINE 20 MG/1
20 TABLET, FILM COATED ORAL 2 TIMES DAILY
Qty: 10 TABLET | Refills: 0 | Status: SHIPPED | OUTPATIENT
Start: 2023-08-10 | End: 2023-08-15

## 2023-08-10 RX ORDER — EPINEPHRINE 0.3 MG/.3ML
0.3 INJECTION SUBCUTANEOUS PRN
Qty: 0.3 ML | Refills: 1 | Status: SHIPPED | OUTPATIENT
Start: 2023-08-10

## 2023-08-10 RX ORDER — METHYLPREDNISOLONE SODIUM SUCCINATE 125 MG/2ML
125 INJECTION, POWDER, LYOPHILIZED, FOR SOLUTION INTRAMUSCULAR; INTRAVENOUS ONCE
Status: COMPLETED | OUTPATIENT
Start: 2023-08-10 | End: 2023-08-10

## 2023-08-10 RX ORDER — PREDNISONE 50 MG/1
50 TABLET ORAL DAILY
Qty: 4 TABLET | Refills: 0 | Status: SHIPPED | OUTPATIENT
Start: 2023-08-10 | End: 2023-08-14

## 2023-08-10 RX ORDER — DIPHENHYDRAMINE HYDROCHLORIDE 50 MG/ML
25 INJECTION INTRAMUSCULAR; INTRAVENOUS ONCE
Status: COMPLETED | OUTPATIENT
Start: 2023-08-10 | End: 2023-08-10

## 2023-08-10 RX ADMIN — EPINEPHRINE 0.3 MG: 1 INJECTION INTRAMUSCULAR; INTRAVENOUS; SUBCUTANEOUS at 16:33

## 2023-08-10 RX ADMIN — DIPHENHYDRAMINE HYDROCHLORIDE 25 MG: 50 INJECTION, SOLUTION INTRAMUSCULAR; INTRAVENOUS at 16:34

## 2023-08-10 RX ADMIN — FAMOTIDINE 20 MG: 10 INJECTION, SOLUTION INTRAVENOUS at 16:34

## 2023-08-10 RX ADMIN — METHYLPREDNISOLONE SODIUM SUCCINATE 125 MG: 125 INJECTION, POWDER, LYOPHILIZED, FOR SOLUTION INTRAMUSCULAR; INTRAVENOUS at 16:34

## 2023-08-10 ASSESSMENT — ACTIVITIES OF DAILY LIVING (ADL)
ADLS_ACUITY_SCORE: 35
ADLS_ACUITY_SCORE: 35

## 2023-08-10 ASSESSMENT — ENCOUNTER SYMPTOMS
ABDOMINAL PAIN: 0
SHORTNESS OF BREATH: 0
COUGH: 0
FEVER: 0
VOMITING: 0
NAUSEA: 0

## 2023-08-10 NOTE — ED PROVIDER NOTES
EMERGENCY DEPARTMENT ENCOUNTER      NAME: Jacinto Cruz IV  AGE: 38 year old male  YOB: 1985  MRN: [id number]  EVALUATION DATE & TIME: 8/10/2023  4:18 PM    PCP: No Ref-Primary, Physician    ED PROVIDER: Viridiana Aguirre M.D.        Chief Complaint   Patient presents with    Insect Bite       FINAL IMPRESSION:    1. Allergic reaction, initial encounter    2. 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  He was stung on the left distal forearm.  Complained of some throat symptoms and scratchiness.  Did not have EpiPen with him.  He was seen in our emergency department about 5 days ago for a bee sting to his leg and otherwise doing well.    Pt received epinephrine and IV medications and is feeling much better.  Plan is for continued observation here for a few more hours before discharge home with refill of his prescriptions including epinephrine, hydroxyzine, prednisone, and Pepcid.    Pt signed out at change of shift awaiting response to meds and recheck.      ED COURSE:  4:25 PM  I met with the patient to gather history and perform my exam. ED course and treatment discussed.    5:03 PM  Rechecked on patient.  He is feeling much better.  Just a little drowsy now from the Benadryl.  The erythema to the left forearm at the site of the bee sting has now improved.  He no longer feels a scratchy throat.  No wheezing or other signs to suggest angioedema.  Plan is to keep him here for a few hours to make sure that he is stable and doing well.  Patient agrees with that plan.  Work note has already been written for him.    Pt signed out at change of shift awaiting obs after meds and disposition.    CRITICAL CARE TIME     Total critical care time: 15 minutes  Critical care time was exclusive of separately billable procedures and treating other patients.  Critical  care was necessary to treat or prevent imminent or life-threatening deterioration of the following conditions: allergic reaction with throat symptoms  Critical care was time spent personally by me on the following activities: development of treatment plan with patient or surrogate, discussions with consultants, examination of patient, evaluation of patient's response to treatment, obtaining history from patient or surrogate, ordering and performing treatments and interventions, ordering and review of laboratory studies, ordering and review of radiographic studies and re-evaluation of patient's condition, this excludes any separately billable procedures.      CONSULTANTS:  none        MEDICATIONS GIVEN IN THE EMERGENCY:  Medications   diphenhydrAMINE (BENADRYL) injection 25 mg (25 mg Intravenous $Given 8/10/23 1634)   methylPREDNISolone sodium succinate (solu-MEDROL) injection 125 mg (125 mg Intravenous $Given 8/10/23 1634)   famotidine (PEPCID) injection 20 mg (20 mg Intravenous $Given 8/10/23 1634)   EPINEPHrine (ADRENALIN) kit 0.3-0.5 mg (0.3 mg Intramuscular $Given 8/10/23 1633)           NEW PRESCRIPTIONS STARTED AT TODAY'S ER VISIT     Medication List        Started      hydrOXYzine 25 MG tablet  Commonly known as: ATARAX  1-2 tabs PO q4hr PRN itching or hives. Do not drive.  Do not mix wih alcohol.     * predniSONE 50 MG tablet  Commonly known as: DELTASONE  50 mg, Oral, DAILY           * This list has 1 medication(s) that are the same as other medications prescribed for you. Read the directions carefully, and ask your doctor or other care provider to review them with you.                Modified      * EPINEPHrine 0.3 MG/0.3ML injection 2-pack  Commonly known as: ANY BX GENERIC EQUIV  0.3 mg, Intramuscular, ONCE PRN  What changed: Another medication with the same name was added. Make sure you understand how and when to take each.     * EPINEPHrine 0.3 MG/0.3ML injection 2-pack  Commonly known as: ANY BX  "GENERIC EQUIV  0.3 mg, Intramuscular, ONCE PRN, May repeat one time in 5-15 minutes if response to initial dose is inadequate.  What changed: Another medication with the same name was added. Make sure you understand how and when to take each.     * EPINEPHrine 0.3 MG/0.3ML injection 2-pack  Commonly known as: ANY BX GENERIC EQUIV  0.3 mg, Intramuscular, PRN, For anaphylaxis  What changed: You were already taking a medication with the same name, and this prescription was added. Make sure you understand how and when to take each.           * This list has 3 medication(s) that are the same as other medications prescribed for you. Read the directions carefully, and ask your doctor or other care provider to review them with you.                ASK your doctor about these medications      * predniSONE 20 MG tablet  Commonly known as: DELTASONE  40 mg, Oral, DAILY  Ask about: Should I take this medication?           * This list has 1 medication(s) that are the same as other medications prescribed for you. Read the directions carefully, and ask your doctor or other care provider to review them with you.                      CONDITION:  stable        DISPOSITION:  Pending         =================================================================  =================================================================  TRIAGE ASSESSMENT:  Pt is here for c/o bee sting to his left arm approximately 15 minutes ago. Pt states he can feel some tightness to his throat. Hx of allergic reaction to bee venom.      Triage Assessment       Row Name 08/10/23 1616       Triage Assessment (Adult)    Airway WDL WDL       Respiratory WDL    Respiratory WDL WDL       Cardiac WDL    Cardiac WDL WDL                         ED Triage Vitals [08/10/23 1615]   Enc Vitals Group      BP (!) 150/102      Pulse 71      Resp 18      Temp 98.2  F (36.8  C)      Temp src Temporal      SpO2 95 %      Weight 81.6 kg (180 lb)      Height 1.803 m (5' 11\")    "       ================================================================  ================================================================    HPI    Patient information was obtained from: patient    Use of Intrepreter: N/A     Jacinto Cruz IV is a 38 year old male with history of bee allergy who presents to the ER with complaints of bee sting.    Patient complains of being stung to the left lower forearm area about 15-30 minutes prior to arrival.  He did not have his EpiPen with him.  He complains of pain and swelling to the area in his throat feeling a little tickly.  Otherwise denies any chest pain, difficulty breathing, abdominal pain, vomiting or diarrhea.    He states that he was seen in the emergency department for a bee sting to his leg recently as well.      REVIEW OF SYSTEMS  Review of Systems   Constitutional:  Negative for fever.   HENT:          +throat scratchiness   Respiratory:  Negative for cough and shortness of breath.    Cardiovascular:  Negative for chest pain.   Gastrointestinal:  Negative for abdominal pain, nausea and vomiting.   Skin:  Positive for rash.   Allergic/Immunologic: Negative for immunocompromised state.   All other systems reviewed and are negative.        PAST MEDICAL HISTORY:  Past Medical History:   Diagnosis Date    Gastro-oesophageal reflux disease          PAST SURGICAL HISTORY:  Past Surgical History:   Procedure Laterality Date    DISCECTOMY LUMBAR POSTERIOR MICROSCOPIC ONE LEVEL Left 3/16/2015    Procedure: DISCECTOMY LUMBAR POSTERIOR MICROSCOPIC ONE LEVEL;  Surgeon: Gm Griggs MD;  Location: [facility] OR    ORTHOPEDIC SURGERY      left elbow orif         CURRENT MEDICATIONS:    Prior to Admission medications    Medication Sig Start Date End Date Taking? Authorizing Provider   EPINEPHrine (ANY BX GENERIC EQUIV) 0.3 MG/0.3ML injection 2-pack Inject 0.3 mLs (0.3 mg) into the muscle once as needed for anaphylaxis or other (severe allergic reaction) May repeat one time in 5-15  minutes if response to initial dose is inadequate. 8/5/23   Mery Chávez MD   EPINEPHrine (ANY BX GENERIC EQUIV) 0.3 MG/0.3ML injection 2-pack Inject 0.3 mLs (0.3 mg) into the muscle once as needed for anaphylaxis 9/1/22   Gustavo Kendrick MD   erythromycin (ROMYCIN) 5 MG/GM ophthalmic ointment Place 0.5 inches Into the left eye 4 times daily  Patient not taking: Reported on 12/18/2022 10/30/22   Mikey Summres MD   erythromycin (ROMYCIN) 5 MG/GM ophthalmic ointment Place 0.5 inches Into the left eye 4 times daily 10/30/22   Mikey Summers MD   famotidine (PEPCID) 20 MG tablet Take 1 tablet (20 mg) by mouth 2 times daily for 5 days 8/5/23 8/10/23  Mery Chávez MD   fluticasone (FLONASE) 50 MCG/ACT nasal spray Spray 1-2 sprays into both nostrils daily 11/4/20   Zoe Phelps MD   gabapentin (NEURONTIN) 300 MG capsule  11/1/22   Reported, Patient   guaiFENesin (MUCINEX) 600 MG 12 hr tablet Take 1,200 mg by mouth 2 times daily    Reported, Patient   ibuprofen (ADVIL/MOTRIN) 200 MG tablet Take 200-400 mg by mouth    Reported, Patient   predniSONE (DELTASONE) 20 MG tablet Take 2 tablets (40 mg) by mouth daily for 4 doses 8/5/23 8/9/23  Mery Chávez MD         ALLERGIES:  Allergies   Allergen Reactions    Bee Venom Shortness Of Breath and Hives    Ibuprofen     Naproxen      Has kidney issues with it         FAMILY HISTORY:  Family History   Problem Relation Age of Onset    Diabetes Sister          SOCIAL HISTORY:  Social History     Socioeconomic History    Marital status: Single   Tobacco Use    Smoking status: Former    Smokeless tobacco: Former    Tobacco comments:     quit 5/8/14 at 1230   Vaping Use    Vaping Use: Never used   Substance and Sexual Activity    Alcohol use: Yes     Comment: occ    Drug use: No    Sexual activity: Yes     Partners: Female         VITALS:  Patient Vitals for the past 24 hrs:   BP Temp Temp src Pulse Resp SpO2 Height Weight   08/10/23 1630 (!)  "145/104 -- -- 75 -- 98 % -- --   08/10/23 1625 (!) 138/101 -- -- 72 -- 97 % -- --   08/10/23 1615 (!) 150/102 98.2  F (36.8  C) Temporal 71 18 95 % 1.803 m (5' 11\") 81.6 kg (180 lb)       Wt Readings from Last 3 Encounters:   08/10/23 81.6 kg (180 lb)   08/05/23 81.6 kg (180 lb)   12/18/22 83.5 kg (184 lb)       Estimated Creatinine Clearance: 114.5 mL/min (based on SCr of 1 mg/dL).    PHYSICAL EXAM    Constitutional:  Well developed, Well nourished, NAD, GCS 15  HENT:  Normocephalic, Atraumatic, Bilateral external ears normal, Oropharynx unremarkable without signs for angioedema, Nose normal. Neck- Supple, No stridor.   Eyes:  PERRL, EOMI, Conjunctiva normal, No discharge.  Respiratory:  Normal breath sounds, No respiratory distress, No wheezing, Speaks full sentences easily. No cough.   Cardiovascular:  Normal heart rate, Regular rhythm,  No rubs, No gallops. Chest wall nontender.   GI:  No excessive obesity.  Bowel sounds normal, Soft, No tenderness, No masses, No flank tenderness. No rebound or guarding.   : deferred  Musculoskeletal:   No cyanosis, No clubbing. Good range of motion in all major joints. No tenderness to palpation or major deformities noted.   Integument:  Warm, Dry,  No petechiae.  Some localized erythema and swelling to the left distal forearm area at the site of reported bee sting.  No other hives or other areas of rash or abnormality appreciated on the body.  Neurologic:  Alert & oriented x 3  Psychiatric:  Affect normal, Cooperative         LAB:  All pertinent labs reviewed and interpreted.  No results found for this or any previous visit (from the past 24 hour(s)).    No results found for: ABORH        RADIOLOGY:  Reviewed all pertinent imaging. Please see official radiology report.    No orders to display         EKG:    none    PROCEDURES:  none      Medical Decision Making    History:  Supplemental history from: Documented in chart, if applicable  External Record(s) reviewed: " Documented in chart, if applicable.    Work Up:  Chart documentation includes differential considered and any EKGs or imaging independently interpreted by provider, where specified.  In additional to work up documented, I considered the following work up: Documented in chart, if applicable.    External consultation:  Discussion of management with another provider: Documented in chart, if applicable    Complicating factors:  Care impacted by chronic illness: Other: bee allergy  Care affected by social determinants of health: N/A    Disposition considerations:  Patient signed out at change of shift awaiting observation after epinephrine and allergic reaction treatment.        Viridiana Aguirre M.D. [facility]  Emergency Medicine and Medical Toxicology  [facility] EMERGENCY DEPARTMENT  [facility] [facility] [id number] [phone]  Dept: [phone]           Viridiana Aguirre MD  08/10/23 1740

## 2023-08-10 NOTE — ED NOTES
EMERGENCY DEPARTMENT SIGN OUT NOTE        ED COURSE AND MEDICAL DECISION MAKING  ED Course as of 08/10/23 1943   Thu Aug 10, 2023   6178 Sign out received: 39 y/o F with bee sting and scratchy throat. Received benadryl, pepcid, solumedrol, epi. Reassess after 3-4 hours.   1935 Reassessed patient. Has been observed for several hours in the ED. Symptoms have overall improved. Vitals remain reassuring. No tongue, lip or posterior orphaynx swelling on my exam. Has some swelling to his left arm where he was stung. Discharged with steroids, epipen, other meds as written by Dr. Bright. Strict return precautions discussed and patient is in agreement with plan and his questions were answered.           FINAL IMPRESSION    1. Allergic reaction, initial encounter    2. Bee sting, accidental or unintentional, initial encounter        ED MEDS  Medications   diphenhydrAMINE (BENADRYL) injection 25 mg (25 mg Intravenous $Given 8/10/23 1634)   methylPREDNISolone sodium succinate (solu-MEDROL) injection 125 mg (125 mg Intravenous $Given 8/10/23 1634)   famotidine (PEPCID) injection 20 mg (20 mg Intravenous $Given 8/10/23 1634)   EPINEPHrine (ADRENALIN) kit 0.3-0.5 mg (0.3 mg Intramuscular $Given 8/10/23 1633)       LAB  Labs Ordered and Resulted from Time of ED Arrival to Time of ED Departure - No data to display      RADIOLOGY    No orders to display       DISCHARGE MEDS  New Prescriptions    EPINEPHRINE (ANY BX GENERIC EQUIV) 0.3 MG/0.3ML INJECTION 2-PACK    Inject 0.3 mLs (0.3 mg) into the muscle as needed for anaphylaxis For anaphylaxis    FAMOTIDINE (PEPCID) 20 MG TABLET    Take 1 tablet (20 mg) by mouth 2 times daily for 5 days    HYDROXYZINE (ATARAX) 25 MG TABLET    1-2 tabs PO q4hr PRN itching or hives. Do not drive.  Do not mix wih alcohol.    PREDNISONE (DELTASONE) 50 MG TABLET    Take 1 tablet (50 mg) by mouth daily for 4 days       Uyen Burgos MD  [facility] EMERGENCY DEPARTMENT  [address]  [facility] [address]  [phone]       Uyen Burgos MD  08/10/23 1944

## 2023-08-10 NOTE — Clinical Note
Jacinto Cruz was seen and treated in our emergency department on 8/10/2023.  He may return to work on 08/14/2023.  Jacinto can return to work sooner if he is feeling better and feels that he can safely do his work on the allergic reaction medications.     If you have any questions or concerns, please don't hesitate to call.      Viridiana Aguirre MD

## 2023-08-10 NOTE — DISCHARGE INSTRUCTIONS
Continue the hydroxyzine (antihistamine like benadryl but often less sedating) every 6 hours for the next 24 hours.  After that you only need to take the hydroxyzine every 6 hours if needed for hives or itching.  Take the prednisone and famotidine daily as directed until gone.      Use the epipen (inject into front of thigh) if you have severe trouble breathing or tongue/lip swelling thought to be caused by allergic reaction and then go to the nearest Emergency Room (ER) immediately.      Return to the ER if you use the epipen, trouble breathing, tongue/lip/throat swelling, or any other concerns.      Any rash may come and go over the next few days until the agent responsible for the reaction is out of your system.  Avoid hot showers/baths as this can make hives and allergic rash worse.      It would be best to be with a responsible adult for the next 24 hours just in case the reaction gets worse or you need any help quickly.    Thank you for choosing [facility] Emergency Department.  It has been my pleasure caring for you today.     ~Dr. Carla MD

## 2023-08-10 NOTE — ED TRIAGE NOTES
Pt is here for c/o bee sting to his left arm approximately 15 minutes ago. Pt states he can feel some tightness to his throat. Hx of allergic reaction to bee venom.      Triage Assessment       Row Name 08/10/23 8503       Triage Assessment (Adult)    Airway WDL WDL       Respiratory WDL    Respiratory WDL WDL       Cardiac WDL    Cardiac WDL WDL

## 2023-08-11 NOTE — ED NOTES
Pt verbalized understanding of DCIs, including f/u and s/sx of when to return to ED. Ambulatory to exit.

## 2023-09-13 ENCOUNTER — TELEPHONE (OUTPATIENT)
Dept: INTERNAL MEDICINE | Facility: CLINIC | Age: 38
End: 2023-09-13

## 2023-09-13 NOTE — TELEPHONE ENCOUNTER
Patient calls. He thinks he was seen in Urgent care a few  years ago for a itchy rash on his buttocks and they recommended he use a cream that was available OTC. Patient is having similar symptoms, but cannot remember what the medication was called.     Reviewed his chart and noted he was seen at Urgent 11/12/18 for rash, diagnosed as candidiasis and they recommended terbinafine (Lamisil) cream along with Desitin or diaper rash cream for skin protection. Patient requesting to have this information sent through [employer] for him. Information sent to patient as requested.     Tatiana Villegas RN  [facility]

## 2024-01-13 ENCOUNTER — HEALTH MAINTENANCE LETTER (OUTPATIENT)
Age: 39
End: 2024-01-13

## 2024-10-25 ENCOUNTER — LAB (OUTPATIENT)
Dept: LAB | Facility: CLINIC | Age: 39
End: 2024-10-25
Payer: COMMERCIAL

## 2024-10-25 ENCOUNTER — VIRTUAL VISIT (OUTPATIENT)
Dept: FAMILY MEDICINE | Facility: OTHER | Age: 39
End: 2024-10-25
Payer: COMMERCIAL

## 2024-10-25 DIAGNOSIS — Z87.441: ICD-10-CM

## 2024-10-25 DIAGNOSIS — R10.84 GENERALIZED ABDOMINAL PAIN: ICD-10-CM

## 2024-10-25 DIAGNOSIS — R31.0 GROSS HEMATURIA: ICD-10-CM

## 2024-10-25 DIAGNOSIS — Z11.3 SCREEN FOR STD (SEXUALLY TRANSMITTED DISEASE): ICD-10-CM

## 2024-10-25 DIAGNOSIS — R35.0 URINARY FREQUENCY: ICD-10-CM

## 2024-10-25 DIAGNOSIS — R31.0 GROSS HEMATURIA: Primary | ICD-10-CM

## 2024-10-25 DIAGNOSIS — R30.0 DYSURIA: ICD-10-CM

## 2024-10-25 DIAGNOSIS — K52.9 CHRONIC DIARRHEA: ICD-10-CM

## 2024-10-25 LAB
ALBUMIN UR-MCNC: NEGATIVE MG/DL
APPEARANCE UR: CLEAR
BASOPHILS # BLD AUTO: 0 10E3/UL (ref 0–0.2)
BASOPHILS NFR BLD AUTO: 1 %
BILIRUB UR QL STRIP: NEGATIVE
COLOR UR AUTO: YELLOW
EOSINOPHIL # BLD AUTO: 0 10E3/UL (ref 0–0.7)
EOSINOPHIL NFR BLD AUTO: 1 %
ERYTHROCYTE [DISTWIDTH] IN BLOOD BY AUTOMATED COUNT: 13.8 % (ref 10–15)
EST. AVERAGE GLUCOSE BLD GHB EST-MCNC: 100 MG/DL
GLUCOSE UR STRIP-MCNC: NEGATIVE MG/DL
HBA1C MFR BLD: 5.1 % (ref 0–5.6)
HCT VFR BLD AUTO: 41.3 % (ref 40–53)
HGB BLD-MCNC: 13.4 G/DL (ref 13.3–17.7)
HGB UR QL STRIP: NEGATIVE
HIV 1+2 AB+HIV1 P24 AG SERPL QL IA: NONREACTIVE
IMM GRANULOCYTES # BLD: 0 10E3/UL
IMM GRANULOCYTES NFR BLD: 0 %
KETONES UR STRIP-MCNC: NEGATIVE MG/DL
LEUKOCYTE ESTERASE UR QL STRIP: NEGATIVE
LYMPHOCYTES # BLD AUTO: 1.5 10E3/UL (ref 0.8–5.3)
LYMPHOCYTES NFR BLD AUTO: 27 %
MCH RBC QN AUTO: 28.3 PG (ref 26.5–33)
MCHC RBC AUTO-ENTMCNC: 32.4 G/DL (ref 31.5–36.5)
MCV RBC AUTO: 87 FL (ref 78–100)
MONOCYTES # BLD AUTO: 0.3 10E3/UL (ref 0–1.3)
MONOCYTES NFR BLD AUTO: 5 %
NEUTROPHILS # BLD AUTO: 3.7 10E3/UL (ref 1.6–8.3)
NEUTROPHILS NFR BLD AUTO: 67 %
NITRATE UR QL: NEGATIVE
PH UR STRIP: 6 [PH] (ref 5–7)
PLATELET # BLD AUTO: 227 10E3/UL (ref 150–450)
RBC # BLD AUTO: 4.74 10E6/UL (ref 4.4–5.9)
SP GR UR STRIP: 1.02 (ref 1–1.03)
UROBILINOGEN UR STRIP-ACNC: 0.2 E.U./DL
WBC # BLD AUTO: 5.5 10E3/UL (ref 4–11)

## 2024-10-25 PROCEDURE — 85025 COMPLETE CBC W/AUTO DIFF WBC: CPT

## 2024-10-25 PROCEDURE — 86780 TREPONEMA PALLIDUM: CPT

## 2024-10-25 PROCEDURE — 80053 COMPREHEN METABOLIC PANEL: CPT

## 2024-10-25 PROCEDURE — 87591 N.GONORRHOEAE DNA AMP PROB: CPT

## 2024-10-25 PROCEDURE — 86695 HERPES SIMPLEX TYPE 1 TEST: CPT

## 2024-10-25 PROCEDURE — 99214 OFFICE O/P EST MOD 30 MIN: CPT | Mod: 95 | Performed by: PHYSICIAN ASSISTANT

## 2024-10-25 PROCEDURE — 87340 HEPATITIS B SURFACE AG IA: CPT

## 2024-10-25 PROCEDURE — 87389 HIV-1 AG W/HIV-1&-2 AB AG IA: CPT

## 2024-10-25 PROCEDURE — 83036 HEMOGLOBIN GLYCOSYLATED A1C: CPT

## 2024-10-25 PROCEDURE — 86696 HERPES SIMPLEX TYPE 2 TEST: CPT

## 2024-10-25 PROCEDURE — 81003 URINALYSIS AUTO W/O SCOPE: CPT

## 2024-10-25 PROCEDURE — 36415 COLL VENOUS BLD VENIPUNCTURE: CPT

## 2024-10-25 PROCEDURE — 84443 ASSAY THYROID STIM HORMONE: CPT

## 2024-10-25 PROCEDURE — 86803 HEPATITIS C AB TEST: CPT

## 2024-10-25 PROCEDURE — 87491 CHLMYD TRACH DNA AMP PROBE: CPT

## 2024-10-25 NOTE — PROGRESS NOTES
Jacinto is a 39 year old who is being evaluated via a billable video visit.    How would you like to obtain your AVS? MyChart  If the video visit is dropped, the invitation should be resent by: Send to e-mail at: [email or web address]  Will anyone else be joining your video visit? No    Assessment & Plan       ICD-10-CM    1. Gross hematuria  R31.0 UA Macroscopic with reflex to Microscopic and Culture - Lab Collect     Comprehensive metabolic panel (BMP + Alb, Alk Phos, ALT, AST, Total. Bili, TP)     CBC with platelets and differential     TSH with free T4 reflex     CT Abdomen Pelvis w Contrast      2. Dysuria  R30.0 UA Macroscopic with reflex to Microscopic and Culture - Lab Collect     Comprehensive metabolic panel (BMP + Alb, Alk Phos, ALT, AST, Total. Bili, TP)     CBC with platelets and differential     TSH with free T4 reflex     CT Abdomen Pelvis w Contrast      3. Urinary frequency  R35.0 UA Macroscopic with reflex to Microscopic and Culture - Lab Collect     Comprehensive metabolic panel (BMP + Alb, Alk Phos, ALT, AST, Total. Bili, TP)     CBC with platelets and differential     TSH with free T4 reflex     CT Abdomen Pelvis w Contrast      4. Generalized abdominal pain  R10.84 Comprehensive metabolic panel (BMP + Alb, Alk Phos, ALT, AST, Total. Bili, TP)     Ova and Parasite Exam Routine     CBC with platelets and differential     TSH with free T4 reflex     CT Abdomen Pelvis w Contrast     Adult GI  Referral - Procedure Only     Adult GI  Referral - Consult Only      5. Chronic diarrhea  K52.9 Comprehensive metabolic panel (BMP + Alb, Alk Phos, ALT, AST, Total. Bili, TP)     Ova and Parasite Exam Routine     CBC with platelets and differential     TSH with free T4 reflex     CT Abdomen Pelvis w Contrast     Adult GI  Referral - Procedure Only     Adult GI  Referral - Consult Only      6. History of minimal change disease  Z87.441 Comprehensive metabolic panel (BMP +  "Alb, Alk Phos, ALT, AST, Total. Bili, TP)      7. Screen for STD (sexually transmitted disease)  Z11.3 HIV Antigen Antibody Combo [XML4066]     Hepatitis B surface antigen [AOL154]     Hepatitis C antibody [PGO476]     Treponema Abs w Reflex to RPR and Titer [BDS5447]     Chlamydia trachomatis PCR [PBG881]     Neisseria gonorrhoeae PCR [XTO8950]     Herpes Simplex Virus 1 and 2 IgG [[id number]]          1-6. I am certainly concerned regarding his urinary symptoms these past 6 months with frequently urination, cloudy/smelly urine and gross hematuria. I recommend a UA along with other labs as noted above. I encouraged him to get this done today at his local [facility]. I also recommend a contrasted CT abdomen/pelvis given his urinary symptoms and chronic diarrhea and abdominal pain that is worsening. Will order a colonoscopy and refer to GI as well. I recommend he stay well hydrated, and he states he is always drinking \"tons\" of water. I am unsure as to the cause of his perianal \"brown bumps\" as he would need an appointment to further evaluate these in person. He would like to have my as his PCP, even though he lives in [place], and I am certainly fine with this if he is okay making the drive when needed. Will determine next steps when we get his diagnostic testing back as he may need to see urology as well. 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 I recommend he stop his daily Advil for now.     7. STD screening ordered per patient request.     A total of 45 minutes spent on reviewing history, discussing plan and completing note.    Verenice Casey is a 39 year old, presenting for the following health issues:  Urinary Problem and Abdominal Pain      10/25/2024     9:33 AM   Additional Questions   Roomed by Rossana   Accompanied by self     History of Present Illness       Reason for visit:  Foamy redish color urine, wants to get tested for celiac diseas      For at " "least 6 months, he has noticed urinary frequency, cloudy urine, malodorous urine, painful urination at times and intermittently red tinged urine. He denies any fevers, chills or flank pain. The red tinged urine maybe occurs 1-2 times weekly. He has never had symptoms like this in the past. He does take 2 Advil every morning for his chronic sinus congestion/inflammation.     He also has frequent loose stools/diarrhea for at least the past 2-4 years. Some days he has two loose stools and other times he is \"sitting on the toilet for a few hours\" with abdominal pain and rectal pain that resolves after bowel movements. Stools are usually a tan color and he denies any blood or black/tarry stools. Stools are usually soft and unformed but sometimes watery. He had normal lab work up in 2022 including stool panels and celiac testing. He has cut out dairy and gluten for the past 2 months with maybe a slight improvement in symptoms. He denies nausea, vomiting or constipation. No fevers, chills or known family history of inflammatory bowel disease. He does state he has some \"brown bumps\" surrounding his anus that have been there a few months. They are not painful and do not bleed. He has intermittent heartburn depending on his diet. He denies rapid weight changes or night sweats. He has not had any recent abdominal imaging and has never undergone a colonoscopy.     He had a new female sexual partner 6 months ago and would like STD screening. No groin rashes or discharge.       Review of Systems  Constitutional, HEENT, cardiovascular, pulmonary, gi and gu systems are negative, except as otherwise noted.      Objective       Vitals:  No vitals were obtained today due to virtual visit.    Physical Exam   GENERAL: alert and no distress  EYES: Eyes grossly normal to inspection.  No discharge or erythema, or obvious scleral/conjunctival abnormalities.  RESP: No audible wheeze, cough, or visible cyanosis.    SKIN: Visible skin clear. " No significant rash, abnormal pigmentation or lesions.  NEURO: Cranial nerves grossly intact. Mentation and speech appropriate for age.  PSYCH: Appropriate affect, tone, and pace of words      Video-Visit Details    Type of service:  Video Visit   Originating Location (pt. Location): Home  Distant Location (provider location):  Off-site  Platform used for Video Visit: Srinivas  Signed Electronically by: Art Morales PA-C

## 2024-10-25 NOTE — PATIENT INSTRUCTIONS
Will order labs and imaging along with a colonoscopy and GI referral.    Stay well hydrated and will determine next steps when testing is back.

## 2024-10-26 ENCOUNTER — APPOINTMENT (OUTPATIENT)
Dept: LAB | Facility: CLINIC | Age: 39
End: 2024-10-26
Payer: COMMERCIAL

## 2024-10-26 LAB
ALBUMIN SERPL BCG-MCNC: 4.2 G/DL (ref 3.5–5.2)
ALP SERPL-CCNC: 82 U/L (ref 40–150)
ALT SERPL W P-5'-P-CCNC: 22 U/L (ref 0–70)
ANION GAP SERPL CALCULATED.3IONS-SCNC: 12 MMOL/L (ref 7–15)
AST SERPL W P-5'-P-CCNC: 24 U/L (ref 0–45)
BILIRUB SERPL-MCNC: 0.4 MG/DL
BUN SERPL-MCNC: 9 MG/DL (ref 6–20)
C TRACH DNA SPEC QL NAA+PROBE: NEGATIVE
CALCIUM SERPL-MCNC: 9.2 MG/DL (ref 8.8–10.4)
CHLORIDE SERPL-SCNC: 102 MMOL/L (ref 98–107)
CREAT SERPL-MCNC: 0.97 MG/DL (ref 0.67–1.17)
EGFRCR SERPLBLD CKD-EPI 2021: >90 ML/MIN/1.73M2
GLUCOSE SERPL-MCNC: 101 MG/DL (ref 70–99)
HBV SURFACE AG SERPL QL IA: NONREACTIVE
HCO3 SERPL-SCNC: 23 MMOL/L (ref 22–29)
HCV AB SERPL QL IA: NONREACTIVE
N GONORRHOEA DNA SPEC QL NAA+PROBE: NEGATIVE
POTASSIUM SERPL-SCNC: 4.1 MMOL/L (ref 3.4–5.3)
PROT SERPL-MCNC: 7.2 G/DL (ref 6.4–8.3)
SODIUM SERPL-SCNC: 137 MMOL/L (ref 135–145)
T PALLIDUM AB SER QL: NONREACTIVE
TSH SERPL DL<=0.005 MIU/L-ACNC: 0.72 UIU/ML (ref 0.3–4.2)

## 2024-10-26 PROCEDURE — 87209 SMEAR COMPLEX STAIN: CPT

## 2024-10-26 PROCEDURE — 87177 OVA AND PARASITES SMEARS: CPT

## 2024-10-28 ENCOUNTER — TELEPHONE (OUTPATIENT)
Dept: GASTROENTEROLOGY | Facility: CLINIC | Age: 39
End: 2024-10-28
Payer: COMMERCIAL

## 2024-10-28 LAB
HSV1 IGG SERPL QL IA: 3.92 INDEX
HSV1 IGG SERPL QL IA: ABNORMAL
HSV2 IGG SERPL QL IA: 0.19 INDEX
HSV2 IGG SERPL QL IA: ABNORMAL
O+P STL MICRO: NEGATIVE
SPECIMEN TYPE: NORMAL

## 2024-10-28 NOTE — TELEPHONE ENCOUNTER
[facility] Call Center    Phone Message    May a detailed message be left on voicemail: Yes    Reason for Call: Other: Patient is currently scheduled on 12/27, as visit type New GI Urgent. This is outside the expected timeline for this referral. Patient has been added to the waitlist.      Action Taken: Message routed to:  Other: GI REFERRAL TRIAGE POOL     Travel Screening: Not Applicable

## 2024-11-06 ENCOUNTER — TELEPHONE (OUTPATIENT)
Dept: GASTROENTEROLOGY | Facility: CLINIC | Age: 39
End: 2024-11-06
Payer: COMMERCIAL

## 2024-11-06 NOTE — TELEPHONE ENCOUNTER
"Endoscopy Scheduling Screen    Have you had any respiratory illness or flu-like symptoms in the last 10 days?  No    What is your communication preference for Instructions and/or Bowel Prep?   MyChart    What insurance is in the chart?  Other:  [employer]    Ordering/Referring Provider:   ANN MARIE WHEATLEY        (If ordering provider performs procedure, schedule with ordering provider unless otherwise instructed. )    BMI: Estimated body mass index is 25.1 kg/m  as calculated from the following:    Height as of 8/10/23: 1.803 m (5' 11\").    Weight as of 8/10/23: 81.6 kg (180 lb).     Sedation Ordered  moderate sedation.   If patient BMI > 50 do not schedule in [facility].    If patient BMI > 45 do not schedule at [facility].    Are you taking methadone or Suboxone?  NO, No RN review required.    Have you been diagnosed and are being treated for severe PTSD or severe anxiety?  NO, No RN review required.    Are you taking any prescription medications for pain 3 or more times per week?   NO, No RN review required.    Do you have a history of malignant hyperthermia?  No    (Females) Are you currently pregnant?   No     Have you been diagnosed or told you have pulmonary hypertension?   No    Do you have an LVAD?  No    Have you been told you have moderate to severe sleep apnea?  No.    Have you been told you have COPD, asthma, or any other lung disease?  No    Do you have any heart conditions?  No     Have you ever had or are you waiting for an organ transplant?  No. Continue scheduling, no site restrictions.    Have you had a stroke or transient ischemic attack (TIA aka \"mini stroke\" in the last 6 months?   No    Have you been diagnosed with or been told you have cirrhosis of the liver?   No.    Are you currently on dialysis?   No    Do you need assistance transferring?   No    BMI: Estimated body mass index is 25.1 kg/m  as calculated from the following:    Height as of 8/10/23: 1.803 m (5' 11\").    Weight as of 8/10/23: 81.6 kg " (180 lb).     Is patients BMI > 40 and scheduling location [facility]?  No    Do you take an injectable or oral medication for weight loss or diabetes (excluding insulin)?  No    Do you take the medication Naltrexone?  No    Do you take blood thinners?  No       Prep   Are you currently on dialysis or do you have chronic kidney disease?  No    Do you have a diagnosis of diabetes?  No    Do you have a diagnosis of cystic fibrosis (CF)?  No    On a regular basis do you go 3 -5 days between bowel movements?  No    BMI > 40?  No    Preferred Pharmacy:    [facility]/pharmacy #[id number] [place], [place] - [address] [address] [facility]  [address] [facility] [address]  Phone: [phone] Fax: [phone]            Final Scheduling Details     Procedure scheduled  Colonoscopy    Surgeon:  Alberto     Date of procedure:  11/15     Pre-OP / PAC:   No - Not required for this site.    Location  [facility] - Patient preference.    Sedation   Moderate Sedation - Per order.      Patient Reminders:   You will receive a call from a Nurse to review instructions and health history.  This assessment must be completed prior to your procedure.  Failure to complete the Nurse assessment may result in the procedure being cancelled.      On the day of your procedure, please designate an adult(s) who can drive you home stay with you for the next 24 hours. The medicines used in the exam will make you sleepy. You will not be able to drive.      You cannot take public transportation, ride share services, or non-medical taxi service without a responsible caregiver.  Medical transport services are allowed with the requirement that a responsible caregiver will receive you at your destination.  We require that drivers and caregivers are confirmed prior to your procedure.

## 2024-11-06 NOTE — TELEPHONE ENCOUNTER
Attempted to contact patient in order to complete pre assessment questions.     Patient answered but it was not a good time.  Patient will return call to [phone] option 2.    Callback required communication sent via [employer].      Procedure details:    Patient scheduled for Colonoscopy on 11.15.2024.     Arrival time: 0930. Procedure time 1015    Facility location: [facility]; [facility] [address] [place] [address]. Check in location: Main entrance, door #1 on the North side of the building under roundabout awning. DO NOT GO TO SURGERY/ED ENTRANCE.     Sedation type: Conscious sedation     Pre op exam needed? No.    Indication for procedure:   Generalized abdominal pain   Chronic diarrhea         Chart review:     Electronic implanted devices? No    Recent diagnosis of diverticulitis within the last 6 weeks? No      Medication review:    Diabetic? No    Anticoagulants? No    Weight loss medication/injectable? No GLP-1 medication per patient's medication list.  RN will verify with pre-assessment call.    Other medication HOLDING recommendations:  N/A      Prep for procedure:     Bowel prep recommendation: Standard Miralax  Due to: standard bowel prep.    Prep instructions sent via [employer].       Viridiana Hopkins RN  Endoscopy Procedure Pre Assessment

## 2024-11-08 ENCOUNTER — HOSPITAL ENCOUNTER (OUTPATIENT)
Dept: CT IMAGING | Facility: CLINIC | Age: 39
Discharge: HOME | End: 2024-11-08
Attending: PHYSICIAN ASSISTANT | Admitting: PHYSICIAN ASSISTANT
Payer: COMMERCIAL

## 2024-11-08 DIAGNOSIS — R35.0 URINARY FREQUENCY: ICD-10-CM

## 2024-11-08 DIAGNOSIS — R30.0 DYSURIA: ICD-10-CM

## 2024-11-08 DIAGNOSIS — R31.0 GROSS HEMATURIA: ICD-10-CM

## 2024-11-08 DIAGNOSIS — R10.84 GENERALIZED ABDOMINAL PAIN: ICD-10-CM

## 2024-11-08 DIAGNOSIS — K52.9 CHRONIC DIARRHEA: ICD-10-CM

## 2024-11-08 PROCEDURE — 250N000011 HC RX IP 250 OP 636: Performed by: PHYSICIAN ASSISTANT

## 2024-11-08 PROCEDURE — 74177 CT ABD & PELVIS W/CONTRAST: CPT

## 2024-11-08 PROCEDURE — 250N000009 HC RX 250: Performed by: PHYSICIAN ASSISTANT

## 2024-11-08 RX ORDER — IOPAMIDOL 755 MG/ML
500 INJECTION, SOLUTION INTRAVASCULAR ONCE
Status: COMPLETED | OUTPATIENT
Start: 2024-11-08 | End: 2024-11-08

## 2024-11-08 RX ADMIN — IOPAMIDOL 91 ML: 755 INJECTION, SOLUTION INTRAVENOUS at 14:29

## 2024-11-08 RX ADMIN — SODIUM CHLORIDE 53 ML: 9 INJECTION, SOLUTION INTRAVENOUS at 14:29

## 2024-11-08 NOTE — TELEPHONE ENCOUNTER
Second call attempt to complete pre assessment.     No answer.  Left message to return call to [phone] #2 by next business day prior to 4PM or procedure will be sent to cancel.     Callback required communication sent via [employer].      Viridiana Hopkins RN  Endoscopy Procedure Pre Assessment

## 2024-11-08 NOTE — TELEPHONE ENCOUNTER
Pre assessment completed for upcoming procedure.   (Please see previous telephone encounter notes for complete details)    Patient  returned call.       Procedure details:    Arrival time and facility location reviewed.    Pre op exam needed? No.    Designated  policy reviewed. Instructed to have someone stay 6  hours post procedure.       Medication review:    Medications reviewed. Please see supporting documentation below. Holding recommendations discussed (if applicable).       Prep for procedure:     Procedure prep instructions reviewed.        Any additional information needed:  N/A      Patient  verbalized understanding and had no questions or concerns at this time.      Tammi Santo RN  Endoscopy Procedure Pre Assessment   [phone] option 2

## 2024-11-11 DIAGNOSIS — R31.0 GROSS HEMATURIA: Primary | ICD-10-CM

## 2024-11-12 ENCOUNTER — TELEPHONE (OUTPATIENT)
Dept: GASTROENTEROLOGY | Facility: CLINIC | Age: 39
End: 2024-11-12
Payer: COMMERCIAL

## 2024-11-12 NOTE — TELEPHONE ENCOUNTER
Caller: Jacinto    Reason for Reschedule/Cancellation   (please be detailed, any staff messages or encounters to note?): Patient      Prior to reschedule please review:  Ordering Provider: Andrew Burr Determined: CS  Does patient have any ASC Exclusions, please identify?: N      Notes on Cancelled Procedure:  Procedure: Lower Endoscopy [Colonoscopy]   Date: 11/15/24  Location: [facility]; [facility] [address] [place] [address]  Surgeon: Alberto      Rescheduled: Yes,   Procedure: Lower Endoscopy [Colonoscopy]    Date: 3/7/25   Location: [facility]; [facility] [address] [place] [address]   Surgeon: Katie   Sedation Level Scheduled  CS ,  Reason for Sedation Level Protocol   Instructions updated and sent: Yes     Does patient need PAC or Pre -Op Rescheduled? : N       Did you cancel or rescheduled an EUS procedure? No.

## 2024-11-12 NOTE — TELEPHONE ENCOUNTER
Incoming call from patient.    Patient has an upcoming colonoscopy on 11/15/2024 and he just had questions regarding when to start the 3-day low fiber diet.  Patient then also had questions about whether or not he might want to cancel this procedure and schedule out until March 2025 due to his insurance reasons.  Patient inquiring how long his referral is good for.  Writer informed him that it is good until 10/24/2025.  Patient would like to think about this.  Writer informed patient if he could please call back by tomorrow if he is going to cancel as these spots fill up very quickly and are in high demand.  Patient verbalized an understanding and grateful for the information.      Azul Mackay RN, BSN  Endoscopy Procedure Pre Assessment RN   [phone] option 2

## 2024-11-18 NOTE — TELEPHONE ENCOUNTER
11/12/2024 - Patient cancelled scheduled appointment. (Cancel Reason: Other (Per pt, will call back to reschedule)    Closing encounter.    Larisa Luevano, [facility]

## 2024-11-25 ENCOUNTER — OFFICE VISIT (OUTPATIENT)
Dept: URGENT CARE | Facility: URGENT CARE | Age: 39
End: 2024-11-25
Payer: COMMERCIAL

## 2024-11-25 VITALS
DIASTOLIC BLOOD PRESSURE: 84 MMHG | HEART RATE: 73 BPM | OXYGEN SATURATION: 99 % | RESPIRATION RATE: 20 BRPM | TEMPERATURE: 99 F | SYSTOLIC BLOOD PRESSURE: 120 MMHG | WEIGHT: 180 LBS | BODY MASS INDEX: 25.1 KG/M2

## 2024-11-25 DIAGNOSIS — J01.90 ACUTE SINUSITIS WITH SYMPTOMS > 10 DAYS: Primary | ICD-10-CM

## 2024-11-25 PROCEDURE — 99213 OFFICE O/P EST LOW 20 MIN: CPT | Performed by: PHYSICIAN ASSISTANT

## 2024-11-25 NOTE — LETTER
November 25, 2024      Jacinto Cruz IV  [id number] [facility] [place] [address]        To Whom It May Concern:    Jacinto Cruz IV was seen in clinic today. Please excuse from work yesterday, today, and tomorrow due to medical illness.         Sincerely,        Gisselle Townsend PA-C

## 2024-11-25 NOTE — PATIENT INSTRUCTIONS
Patient was educated on the natural course of condition.  Take medications as prescribed. Side effects may include stomachache or diarrhea. Conservative measures discussed including increased fluids, nasal saline irrigation (neti pot), warm steamy shower, expectorants (Mucinex), decongestants (Pseudofed), and analgesics (Tylenol and/or Ibuprofen). See your primary care provider if symptoms worsen or do not improve in 7 days. Seek emergency care if you develop fever over 103 or shortness of breath.

## 2024-11-25 NOTE — PROGRESS NOTES
URGENT CARE VISIT:    SUBJECTIVE:   Jacinto Cruz IV is a 39 year old male presenting with a chief complaint of fever, stuffy nose, cough - productive, sore throat, and facial pain/pressure.  Onset was 3 week(s) ago.   He denies the following symptoms: shortness of breath  Course of illness is worsening.    Treatment measures tried include sudafed with no relief of symptoms.  Predisposing factors include None.    PMH:   Past Medical History:   Diagnosis Date    Gastro-oesophageal reflux disease      Allergies: Bee venom, Ibuprofen, and Naproxen   Medications:   Current Outpatient Medications   Medication Sig Dispense Refill    amoxicillin-clavulanate (AUGMENTIN) 875-125 MG tablet Take 1 tablet by mouth 2 times daily for 10 days. 20 tablet 0    EPINEPHrine (ANY BX GENERIC EQUIV) 0.3 MG/0.3ML injection 2-pack Inject 0.3 mLs (0.3 mg) into the muscle as needed for anaphylaxis For anaphylaxis (Patient not taking: Reported on 10/25/2024) 0.3 mL 1    EPINEPHrine (ANY BX GENERIC EQUIV) 0.3 MG/0.3ML injection 2-pack Inject 0.3 mLs (0.3 mg) into the muscle once as needed for anaphylaxis or other (severe allergic reaction) May repeat one time in 5-15 minutes if response to initial dose is inadequate. (Patient not taking: Reported on 10/25/2024) 2 each 0    EPINEPHrine (ANY BX GENERIC EQUIV) 0.3 MG/0.3ML injection 2-pack Inject 0.3 mLs (0.3 mg) into the muscle once as needed for anaphylaxis (Patient not taking: Reported on 10/25/2024) 1 each 0    erythromycin (ROMYCIN) 5 MG/GM ophthalmic ointment Place 0.5 inches Into the left eye 4 times daily (Patient not taking: Reported on 10/25/2024) 4 g 0    erythromycin (ROMYCIN) 5 MG/GM ophthalmic ointment Place 0.5 inches Into the left eye 4 times daily (Patient not taking: Reported on 10/25/2024) 4 g 0    fluticasone (FLONASE) 50 MCG/ACT nasal spray Spray 1-2 sprays into both nostrils daily 16 g 0    gabapentin (NEURONTIN) 300 MG capsule  (Patient not taking: Reported on  10/25/2024)      guaiFENesin (MUCINEX) 600 MG 12 hr tablet Take 1,200 mg by mouth 2 times daily (Patient not taking: Reported on 10/25/2024)      hydrOXYzine (ATARAX) 25 MG tablet 1-2 tabs PO q4hr PRN itching or hives. Do not drive.  Do not mix wih alcohol. (Patient not taking: Reported on 10/25/2024) 20 tablet 0    ibuprofen (ADVIL/MOTRIN) 200 MG tablet Take 200-400 mg by mouth       Social History:   Social History     Tobacco Use    Smoking status: Former    Smokeless tobacco: Former    Tobacco comments:     quit 5/8/14 at 1230   Substance Use Topics    Alcohol use: Yes     Comment: occ       ROS:  Review of systems negative except as stated above.    OBJECTIVE:  /84   Pulse 73   Temp 99  F (37.2  C)   Resp 20   Wt 81.6 kg (180 lb)   SpO2 99%   BMI 25.10 kg/m    GENERAL APPEARANCE: healthy, alert and no distress  EYES: EOMI,  PERRL, conjunctiva clear  HENT: ear canals and TM's normal.  Nose and mouth without ulcers, erythema or lesions  NECK: supple, nontender, no lymphadenopathy  RESP: lungs clear to auscultation - no rales, rhonchi or wheezes  CV: regular rates and rhythm, normal S1 S2, no murmur noted  SKIN: no suspicious lesions or rashes      ASSESSMENT:    ICD-10-CM    1. Acute sinusitis with symptoms > 10 days  J01.90 amoxicillin-clavulanate (AUGMENTIN) 875-125 MG tablet          PLAN:  Patient Instructions   Patient was educated on the natural course of condition.  Take medications as prescribed. Side effects may include stomachache or diarrhea. Conservative measures discussed including increased fluids, nasal saline irrigation (neti pot), warm steamy shower, expectorants (Mucinex), decongestants (Pseudofed), and analgesics (Tylenol and/or Ibuprofen). See your primary care provider if symptoms worsen or do not improve in 7 days. Seek emergency care if you develop fever over 103 or shortness of breath.    Patient verbalized understanding and is agreeable to plan. The patient was discharged  ambulatory and in stable condition.    VALENTE Rachel...................  11/25/2024   12:42 PM

## 2025-01-25 ENCOUNTER — HEALTH MAINTENANCE LETTER (OUTPATIENT)
Age: 40
End: 2025-01-25

## 2025-01-29 ENCOUNTER — OFFICE VISIT (OUTPATIENT)
Dept: URGENT CARE | Facility: URGENT CARE | Age: 40
End: 2025-01-29
Payer: COMMERCIAL

## 2025-01-29 VITALS
TEMPERATURE: 99.2 F | DIASTOLIC BLOOD PRESSURE: 95 MMHG | WEIGHT: 172 LBS | BODY MASS INDEX: 23.99 KG/M2 | HEART RATE: 85 BPM | OXYGEN SATURATION: 98 % | SYSTOLIC BLOOD PRESSURE: 144 MMHG | RESPIRATION RATE: 18 BRPM

## 2025-01-29 DIAGNOSIS — Z91.030 BEE STING ALLERGY: ICD-10-CM

## 2025-01-29 DIAGNOSIS — R07.0 THROAT PAIN: ICD-10-CM

## 2025-01-29 DIAGNOSIS — J01.90 ACUTE SINUSITIS WITH SYMPTOMS > 10 DAYS: Primary | ICD-10-CM

## 2025-01-29 LAB — DEPRECATED S PYO AG THROAT QL EIA: NEGATIVE

## 2025-01-29 PROCEDURE — 99213 OFFICE O/P EST LOW 20 MIN: CPT | Performed by: FAMILY MEDICINE

## 2025-01-29 PROCEDURE — 87651 STREP A DNA AMP PROBE: CPT | Performed by: FAMILY MEDICINE

## 2025-01-29 RX ORDER — EPINEPHRINE 0.3 MG/.3ML
0.3 INJECTION SUBCUTANEOUS PRN
Qty: 0.3 ML | Refills: 1 | Status: SHIPPED | OUTPATIENT
Start: 2025-01-29

## 2025-01-29 RX ORDER — DOXYCYCLINE 100 MG/1
100 CAPSULE ORAL 2 TIMES DAILY
Qty: 20 CAPSULE | Refills: 0 | Status: SHIPPED | OUTPATIENT
Start: 2025-01-29 | End: 2025-02-08

## 2025-01-29 RX ORDER — PREDNISONE 20 MG/1
40 TABLET ORAL DAILY
Qty: 10 TABLET | Refills: 0 | Status: SHIPPED | OUTPATIENT
Start: 2025-01-29 | End: 2025-02-03

## 2025-01-30 LAB — S PYO DNA THROAT QL NAA+PROBE: NOT DETECTED

## 2025-01-30 NOTE — PROGRESS NOTES
SUBJECTIVE:  Chief Complaint   Patient presents with    Urgent Care     He has pain in both ears, and throat pain for 2 weeks now   He needs a refill on his Epi pen        Jacinto Cruz IV is a 39 year old male who presents with a chief complaint of ear and neck pain for 2 weeks.  Requesting refill of epi-pen    Had sinus infection and did improve, this was treated 11/25 with Augmentin    Developed ear popping and then started to have more sinus and face pain.  Has more sore throat and now having trouble with swallowing, unable to eat due to discomfort.  Taking sudafed, tylenol, mucinex without improvement    Requesting epi-pen, has bee allergy    Past Medical History:   Diagnosis Date    Gastro-oesophageal reflux disease      Current Outpatient Medications   Medication Sig Dispense Refill    ibuprofen (ADVIL/MOTRIN) 200 MG tablet Take 200-400 mg by mouth.      EPINEPHrine (ANY BX GENERIC EQUIV) 0.3 MG/0.3ML injection 2-pack Inject 0.3 mLs (0.3 mg) into the muscle as needed for anaphylaxis For anaphylaxis (Patient not taking: Reported on 1/29/2025) 0.3 mL 1    EPINEPHrine (ANY BX GENERIC EQUIV) 0.3 MG/0.3ML injection 2-pack Inject 0.3 mLs (0.3 mg) into the muscle once as needed for anaphylaxis or other (severe allergic reaction) May repeat one time in 5-15 minutes if response to initial dose is inadequate. (Patient not taking: Reported on 1/29/2025) 2 each 0    EPINEPHrine (ANY BX GENERIC EQUIV) 0.3 MG/0.3ML injection 2-pack Inject 0.3 mLs (0.3 mg) into the muscle once as needed for anaphylaxis (Patient not taking: Reported on 1/29/2025) 1 each 0    erythromycin (ROMYCIN) 5 MG/GM ophthalmic ointment Place 0.5 inches Into the left eye 4 times daily (Patient not taking: Reported on 12/18/2022) 4 g 0    erythromycin (ROMYCIN) 5 MG/GM ophthalmic ointment Place 0.5 inches Into the left eye 4 times daily (Patient not taking: Reported on 1/29/2025) 4 g 0    fluticasone (FLONASE) 50 MCG/ACT nasal spray Spray 1-2 sprays  into both nostrils daily (Patient not taking: Reported on 1/29/2025) 16 g 0    gabapentin (NEURONTIN) 300 MG capsule  (Patient not taking: Reported on 1/29/2025)      guaiFENesin (MUCINEX) 600 MG 12 hr tablet Take 1,200 mg by mouth 2 times daily (Patient not taking: Reported on 1/29/2025)      hydrOXYzine (ATARAX) 25 MG tablet 1-2 tabs PO q4hr PRN itching or hives. Do not drive.  Do not mix wih alcohol. (Patient not taking: Reported on 1/29/2025) 20 tablet 0     Social History     Tobacco Use    Smoking status: Former    Smokeless tobacco: Former    Tobacco comments:     quit 5/8/14 at 1230   Substance Use Topics    Alcohol use: Yes     Comment: occ       ROS:  Review of systems negative except as stated above.    EXAM:   BP (!) 144/95 (BP Location: Right arm, Patient Position: Sitting, Cuff Size: Adult Regular)   Pulse 85   Temp 99.2  F (37.3  C) (Temporal)   Resp 18   Wt 78 kg (172 lb)   SpO2 98%   BMI 23.99 kg/m    GENERAL APPEARANCE: healthy, alert and no distress  EARS: bilateral ear canals and TMs normal  MOUTH: moist, pharynx with mildly pink tonsils with no exudates, uvula midline  CHEST: no audible wheezes or increase work of breathing  PSYCH:alert, affect bright    Results for orders placed or performed in visit on 01/29/25   Streptococcus A Rapid Screen w/Reflex to PCR - Clinic Collect     Status: Normal    Specimen: Throat; Swab   Result Value Ref Range    Group A Strep antigen Negative Negative       ASSESSMENT/PLAN:  (J01.90) Acute sinusitis with symptoms > 10 days  (primary encounter diagnosis)  Plan: doxycycline hyclate (VIBRAMYCIN) 100 MG         capsule, predniSONE (DELTASONE) 20 MG tablet            (R07.0) Throat pain  Plan: Streptococcus A Rapid Screen w/Reflex to PCR -         Clinic Collect, Group A Streptococcus PCR         Throat Swab            (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  RX Doxycycline and RX prednisone burst given for sinus infection due to prolong and worsening symptoms.  Will follow up on throat culture and treat if positive for strep.    RX Epi pen given for bee sting allergy to have on hand.  Encourage to obtain future refill from primary provider.    Follow up with primary provider if no improvement of symptoms in 1-2 weeks    Colton Degroot MD  January 29, 2025 7:44 PM

## 2025-02-12 ENCOUNTER — ANCILLARY PROCEDURE (OUTPATIENT)
Dept: GENERAL RADIOLOGY | Facility: CLINIC | Age: 40
End: 2025-02-12
Attending: FAMILY MEDICINE
Payer: COMMERCIAL

## 2025-02-12 ENCOUNTER — OFFICE VISIT (OUTPATIENT)
Dept: URGENT CARE | Facility: URGENT CARE | Age: 40
End: 2025-02-12
Payer: COMMERCIAL

## 2025-02-12 VITALS
TEMPERATURE: 101 F | WEIGHT: 172 LBS | OXYGEN SATURATION: 99 % | SYSTOLIC BLOOD PRESSURE: 138 MMHG | RESPIRATION RATE: 18 BRPM | DIASTOLIC BLOOD PRESSURE: 88 MMHG | HEART RATE: 94 BPM | BODY MASS INDEX: 23.99 KG/M2

## 2025-02-12 DIAGNOSIS — J01.90 ACUTE SINUSITIS WITH SYMPTOMS > 10 DAYS: ICD-10-CM

## 2025-02-12 DIAGNOSIS — Z20.822 SUSPECTED 2019 NOVEL CORONAVIRUS INFECTION: ICD-10-CM

## 2025-02-12 DIAGNOSIS — J32.1 CHRONIC FRONTAL SINUSITIS: ICD-10-CM

## 2025-02-12 DIAGNOSIS — R50.9 FEVER, UNSPECIFIED: Primary | ICD-10-CM

## 2025-02-12 DIAGNOSIS — R05.2 SUBACUTE COUGH: ICD-10-CM

## 2025-02-12 DIAGNOSIS — R50.9 FEVER, UNSPECIFIED: ICD-10-CM

## 2025-02-12 DIAGNOSIS — Z76.89 ESTABLISHING CARE WITH NEW DOCTOR, ENCOUNTER FOR: ICD-10-CM

## 2025-02-12 LAB
BASOPHILS # BLD AUTO: 0 10E3/UL (ref 0–0.2)
BASOPHILS NFR BLD AUTO: 0 %
EOSINOPHIL # BLD AUTO: 0.1 10E3/UL (ref 0–0.7)
EOSINOPHIL NFR BLD AUTO: 1 %
ERYTHROCYTE [DISTWIDTH] IN BLOOD BY AUTOMATED COUNT: 14.5 % (ref 10–15)
FLUAV AG SPEC QL IA: NEGATIVE
FLUBV AG SPEC QL IA: NEGATIVE
HCT VFR BLD AUTO: 43.9 % (ref 40–53)
HGB BLD-MCNC: 14.3 G/DL (ref 13.3–17.7)
IMM GRANULOCYTES # BLD: 0 10E3/UL
IMM GRANULOCYTES NFR BLD: 0 %
LYMPHOCYTES # BLD AUTO: 1.1 10E3/UL (ref 0.8–5.3)
LYMPHOCYTES NFR BLD AUTO: 15 %
MCH RBC QN AUTO: 27.5 PG (ref 26.5–33)
MCHC RBC AUTO-ENTMCNC: 32.6 G/DL (ref 31.5–36.5)
MCV RBC AUTO: 84 FL (ref 78–100)
MONOCYTES # BLD AUTO: 0.9 10E3/UL (ref 0–1.3)
MONOCYTES NFR BLD AUTO: 13 %
NEUTROPHILS # BLD AUTO: 5.3 10E3/UL (ref 1.6–8.3)
NEUTROPHILS NFR BLD AUTO: 71 %
PLATELET # BLD AUTO: 192 10E3/UL (ref 150–450)
RBC # BLD AUTO: 5.2 10E6/UL (ref 4.4–5.9)
WBC # BLD AUTO: 7.4 10E3/UL (ref 4–11)

## 2025-02-12 PROCEDURE — 85025 COMPLETE CBC W/AUTO DIFF WBC: CPT | Performed by: FAMILY MEDICINE

## 2025-02-12 PROCEDURE — 36415 COLL VENOUS BLD VENIPUNCTURE: CPT | Performed by: FAMILY MEDICINE

## 2025-02-12 PROCEDURE — 87804 INFLUENZA ASSAY W/OPTIC: CPT | Performed by: FAMILY MEDICINE

## 2025-02-12 PROCEDURE — 71046 X-RAY EXAM CHEST 2 VIEWS: CPT | Mod: TC | Performed by: RADIOLOGY

## 2025-02-12 RX ORDER — PREDNISONE 10 MG/1
TABLET ORAL
Qty: 30 TABLET | Refills: 0 | Status: SHIPPED | OUTPATIENT
Start: 2025-02-12

## 2025-02-12 RX ORDER — CEFIXIME 400 MG/1
400 CAPSULE ORAL DAILY
Qty: 14 CAPSULE | Refills: 0 | Status: SHIPPED | OUTPATIENT
Start: 2025-02-12 | End: 2025-02-26

## 2025-02-12 ASSESSMENT — ENCOUNTER SYMPTOMS
SHORTNESS OF BREATH: 1
COUGH: 1
WHEEZING: 1
FEVER: 1
SINUS PRESSURE: 1

## 2025-02-12 NOTE — PATIENT INSTRUCTIONS
Discharge instructions from Dr. Giles:    Today you were seen for: ongoing symptoms     The Plan for you to get better is :    I want you to follow-up with internal medicine for primary care I am going to try 14 days of antibiotics any longer course of the steroid taper     I would like you to go to an internal medicine specialist for primary care        And also if you continue to have sinus issues I have placed a referral for ear nose and throat    What do I do if this does not change or fails to improve? :    I anticipate you will improve in 5 to 7 days. If you fail to improve or worsen please be reseen by your primary care physician or clinician, or urgent care.  If you are worse please go to the emergency room.       What to do if I am really worse? :    If you feel you are woserning with life threatening symptoms such as: a very fast heart rate difficulty breathing, increasing confusion, uncontrolled pain, the inability to keep any solids or liquids down, a failure to urinate or other abnormal and worsening symptoms go to an emergency room immediately.        Where are my test results if they were not back when I was discharged? :      Test results done in Urgent Care are released automatically as soon as they are resulted via [employer]. There are some instances when we call you with positive same day results (strep, covid,urine culture) They may also be mailed to you if you do not have my chart.      If you are waiting on a test for strep throat and it is positive medication will be called into the pharmacy of record.        FYI if you were prescribed narcotics:    If you have been prescribed any narcotic pain medication in the urgent care these medications are not refilled by Urgent Care and you should see her primary care physician for any refills.  Of note any narcotic pain medication has the potential to become habit-forming and can impact a person sobriety and recovery. If they were prescribed for  medically appropriate reason and are causing these unintended effects please discuss with your primary care physician immediately.    Your health is important to all of us here at [place] and the Urgent Care and we appreciate your trust and partnership in your return to health here today.

## 2025-02-12 NOTE — PROGRESS NOTES
Patient presents with:  Urgent Care: He was seen on 01/29 and now his back for the same thing again, it seems like the treatment wasn't working for him       Assessment/MDM:    Jacinto Cruz IV is a 39 year old male is here today for ongoing issues . the following systemic symptoms are present : fever, cough and congestion .  Patient has been on antibiotics x 2 and does seem to improve when on them with prednisone burst but then symptoms reappear.  He also seems to have an ongoing issue of fatigue and fever that is not improving and has been going on for months?  From an urgent care perspective I am going to place him on Suprax x 14 days and a long taper of steroid to see if he just has 1 difficult to treat sinus infection and I have referred him to ENT if this fails to improve.  For the larger issue of possible intermittent fevers, arthritis and history of kidney issues I have put a referral in for internal medicine to establish care for primary he may need a workup to see if there is any umbrella tying these things together. Over 30  minutes was spent in the direct history taking, physical exam, note review, review of medical records and pertinent articles in up-to-date as well as creation of a plan to prevent significant morbidity and mortality in care. This time is excluding any treatments such as NEBS or IV fluid          HPI:  Seen on 1/29 for same presentation.  Treated appropriately with antibiotics and steroids per patient did not ever quite improve.  Some improvement on steroids but now symptoms have come back.  With fever, body aches nonproductive cough and facial pressure.    Fever  Associated symptoms include coughing and a fever.        Review of Systems   Constitutional:  Positive for fever.   HENT:  Positive for sinus pressure.    Respiratory:  Positive for cough, shortness of breath and wheezing.    All other systems reviewed and are negative.      Vitals:    02/12/25 1251   BP: 138/88   BP  Location: Right arm   Patient Position: Sitting   Cuff Size: Adult Regular   Pulse: 94   Resp: 18   Temp: 101  F (38.3  C)   TempSrc: Tympanic   SpO2: 99%   Weight: 78 kg (172 lb)       Physical Exam  Vitals and nursing note reviewed.   HENT:      Right Ear: A middle ear effusion is present. Tympanic membrane is bulging.      Left Ear: A middle ear effusion is present. Tympanic membrane is bulging.   Cardiovascular:      Rate and Rhythm: Normal rate and regular rhythm.   Pulmonary:      Effort: Pulmonary effort is normal.      Comments: Course airways   Neurological:      Mental Status: He is alert.       Results:  No results found for any visits on 02/12/25.        Past Medical History: has been reviewed by me. I have also reviewed past visits, lab results and studies  Adverse Drug Reactions: Bee venom, Ibuprofen, and Naproxen    Medications: reviewed by me today    Family History: Reviewed by me today  Social History:   Social History     Tobacco Use    Smoking status: Former    Smokeless tobacco: Former    Tobacco comments:     quit 5/8/14 at 1230   Substance Use Topics    Alcohol use: Yes     Comment: occ       Tobacco:   History   Smoking Status    Former   Smokeless Tobacco    Former         I have reviewed and recommended any over-the-counter medications that will aid in the symptomatic relief of this illness.    The risk of complications, morbidity, and/or mortality of patient management decisions were made during the visit with the patient. These may be associated with the patient s problems, the diagnostic procedures, or the treatment. This includes possible management options selected, as well options considered but ultimately not selected, after shared medical decision making with the patient and/or family.        ICD-10-CM    1. Fever, unspecified  R50.9 Influenza A/B antigen     COVID-19 Virus (Coronavirus) by PCR Nasopharyngeal      2. Suspected 2019 novel coronavirus infection  Z20.822 Influenza A/B  antigen     COVID-19 Virus (Coronavirus) by PCR Nasopharyngeal           Happy Chan MOREIRA  2/12/2025, 1:17 PM.      There are no Patient Instructions on file for this visit.

## 2025-02-13 ENCOUNTER — TELEPHONE (OUTPATIENT)
Dept: NURSING | Facility: CLINIC | Age: 40
End: 2025-02-13
Payer: COMMERCIAL

## 2025-02-13 LAB — SARS-COV-2 RNA RESP QL NAA+PROBE: POSITIVE

## 2025-02-13 NOTE — TELEPHONE ENCOUNTER
Clinic RN: Please investigate patient's chart or contact patient if the information cannot be found because this medication was prescribed for an acute condition. Confirm current symptoms/need for medication and possible need for appointment. If necessary, document reason and route refill encounter to provider for approval or denial.    Zahra ADRIAN RN  [facility]

## 2025-02-13 NOTE — TELEPHONE ENCOUNTER
I call and spoke with the patient, he did state that he feels a little better with the medication that Dr. García prescribe for him yesterday. I told him that he doesn't fit the criteria for covid treatment. I did advice him that he ould use over the counter flu and cold meds too. He is in understanding with the plan.   Marco A King, CMA

## 2025-02-13 NOTE — TELEPHONE ENCOUNTER
"Called and left voice message for patient to call [phone] and ask to speak to a nurse.     Upon call back please:  -inquire about symptoms and medication usage  -schedule for establishing care OV as not established in     Per chart review   OV 1/29/225 and 1/30/25  \"Jacinto Cruz IV is a 39 year old male who presents with a chief complaint of ear and neck pain for 2 weeks.  Requesting refill of epi-pen   Had sinus infection and did improve, this was treated 11/25 with Augmentin     Follow up with primary provider if no improvement of symptoms in 1-2 weeks   I want you to follow-up with internal medicine for primary care I am going to try 14 days of antibiotics any longer course of the steroid taper    I would like you to go to an internal medicine specialist for primary care   And also if you continue to have sinus issues I have placed a referral for ear nose and throat\"      Awaiting call back at this time.    Danielle Padilla RN           "

## 2025-02-13 NOTE — TELEPHONE ENCOUNTER
Coronavirus (COVID-19) Notification    Caller Name (Patient, parent, daughter/son, grandparent, etc)  Patient- Jacinto    Reason for call  Notify of Positive Coronavirus (COVID-19) lab results, assess symptoms,  review [facility] recommendations    Lab Result    Lab test:  2019-nCoV rRt-PCR or SARS-CoV-2 PCR    Oropharyngeal AND/OR nasopharyngeal swabs is POSITIVE for 2019-nCoV RNA/SARS-COV-2 PCR (COVID-19 virus)  Gather patient reported symptoms   Assessment   Current Symptoms at time of phone call, reported by patient: (if no symptoms, document: No symptoms] Nausea, sore throat, sinus pain and pressure, cough, fever   Date of symptom(s) onset (if applicable) Over a month- but symptoms that made him return to  started on 2/8     If at time of call, Patients symptoms have worsened, the Patient should contact 911 or have someone drive them to Emergency Dept promptly:    If Patient calling 911, inform 911 personal that you have tested positive for the Coronavirus (COVID-19).  Place mask on and await 911 to arrive.  If Emergency Dept, If possible, please have another adult drive you to the Emergency Dept but you need to wear mask when in contact with other people.      Treatment Options:   Is patient interested in discussing COVID treatment? Yes.   Is this a [place] or [facility] Patient? No:   COVID Positive/Requesting COVID treatment    Patient is positive for COVID and requesting treatment options.    Date of positive COVID test (PCR or at home)? 2/12  Current COVID symptoms: fever or chills, cough, shortness of breath or difficulty breathing, fatigue, muscle or body aches, headache, sore throat, congestion or runny nose, and nausea or vomiting  Date COVID symptoms began: symptoms that caused him to return to urgent care started on 2/8    Message should be routed to clinic RN pool. Best phone number to use for call back: [phone]       Review information with Patient    Your result was positive. This  means you have COVID-19 (coronavirus).    How can I protect others?    These guidelines are for isolating before returning to work, school or .    If you DO have symptoms  Stay home and away from others   For at least 5 days after your symptoms started, AND  You are fever free for 24 hours (with no medicine that reduces fever), AND  Your other symptoms are better  Wear a mask for 10 full days anytime you are around others    If you DON'T have symptoms  Stay home and away from others for at least 5 days after your positive test  Wear a mask for 10 full days anytime you are around others    There may be different guidelines for healthcare facilities.  Please check with the specific sites before arriving.    If you have been told by a doctor that you were severely ill with COVID-19 or are immunocompromised, you should isolate for at least 10 days.    You should not go back to work until you meet the guidelines above for ending your home isolation. You don't need to be retested for COVID-19 before going back to work--studies show that you won't spread the virus if it's been at least 10 days since your symptoms started (or 20 days, if you have a weak immune system).    Employers, schools, and daycares: This is an official notice for this person's medical guidelines for returning in-person.  They must meet the above guidelines before going back to work, school or  in person.    You will receive a positive COVID-19 letter via [email or web address] or the mail soon with additional self-care information.    Would you like me to review some of that information with you now?  No    If you were tested for an upcoming procedure, please contact your provider for next steps.    Juan Mota

## 2025-02-17 RX ORDER — DOXYCYCLINE 100 MG/1
CAPSULE ORAL
Qty: 20 CAPSULE | Refills: 0 | OUTPATIENT
Start: 2025-02-17

## 2025-02-17 NOTE — TELEPHONE ENCOUNTER
Refusing with reason: patient needs appointment as instructed to do based on refill workflow. Closing encounter.    Radha Vital RN on 2/17/2025 at 12:23 PM

## 2025-02-21 ENCOUNTER — TELEPHONE (OUTPATIENT)
Dept: GASTROENTEROLOGY | Facility: CLINIC | Age: 40
End: 2025-02-21
Payer: COMMERCIAL

## 2025-02-21 NOTE — TELEPHONE ENCOUNTER
Rescheduled Colonoscopy  Due to:  insurance reasons (patient last completed PA 11/2024)      Pre visit planning completed.      Procedure details:    Patient scheduled for Colonoscopy on 3/7/25.     Arrival time: 0950. Procedure time 1035    Facility location: [facility]; [address] [place] [address]. Check in location: Main entrance, door #1 on the North side of the building under roundabout awning. DO NOT GO TO SURGERY/ED ENTRANCE.     Sedation type: Conscious sedation     Pre op exam needed? No.    Indication for procedure: Generalized abdominal pain, chronic diarrhea      Chart review:     Electronic implanted devices? No    Recent diagnosis of diverticulitis within the last 6 weeks? No      Medication review:    Diabetic? No    Anticoagulants? No    Weight loss medication/injectable? No GLP-1 medication per patient's medication list. Nursing to verify with pre-assessment call.    Other medication HOLDING recommendations:  N/A      Prep for procedure:     Bowel prep recommendation: Standard Miralax.   Due to: standard bowel prep    Procedure information and instructions sent via [employer]         Deanna Shelby RN  Endoscopy Procedure Pre Assessment   [phone] option 3

## 2025-03-07 ENCOUNTER — ANCILLARY PROCEDURE (OUTPATIENT)
Dept: GENERAL RADIOLOGY | Facility: CLINIC | Age: 40
End: 2025-03-07
Attending: PHYSICIAN ASSISTANT
Payer: COMMERCIAL

## 2025-03-07 DIAGNOSIS — M25.551 HIP PAIN, RIGHT: ICD-10-CM

## 2025-03-07 PROCEDURE — 73502 X-RAY EXAM HIP UNI 2-3 VIEWS: CPT | Mod: TC | Performed by: RADIOLOGY

## 2025-03-13 ENCOUNTER — OFFICE VISIT (OUTPATIENT)
Dept: SURGERY | Facility: CLINIC | Age: 40
End: 2025-03-13
Payer: COMMERCIAL

## 2025-03-13 ENCOUNTER — TELEPHONE (OUTPATIENT)
Dept: SURGERY | Facility: CLINIC | Age: 40
End: 2025-03-13

## 2025-03-13 VITALS
WEIGHT: 182 LBS | RESPIRATION RATE: 16 BRPM | DIASTOLIC BLOOD PRESSURE: 88 MMHG | HEART RATE: 66 BPM | BODY MASS INDEX: 25.48 KG/M2 | OXYGEN SATURATION: 97 % | SYSTOLIC BLOOD PRESSURE: 118 MMHG | HEIGHT: 71 IN

## 2025-03-13 DIAGNOSIS — R22.9 MASS OF SKIN: ICD-10-CM

## 2025-03-13 NOTE — PROGRESS NOTES
This is a 39-year-old patient who presents with an enlarging mass on his right posterior scalp.  He has noticed this for at least a couple of years.  It is getting increasingly painful and enlarging.  Pressure on the area causes headaches.  It has never drained.  It has not become inflamed.    Past Medical History:   Diagnosis Date    Gastro-oesophageal reflux disease      Past Surgical History:   Procedure Laterality Date    DISCECTOMY LUMBAR POSTERIOR MICROSCOPIC ONE LEVEL Left 3/16/2015    Procedure: DISCECTOMY LUMBAR POSTERIOR MICROSCOPIC ONE LEVEL;  Surgeon: Gm Griggs MD;  Location: [facility] OR    ORTHOPEDIC SURGERY      left elbow orif     10 point review of systems is negative other than those issues noted above.    Physical exam:  The patient is in no apparent distress.  Breathing is nonlabored.  The right posterior scalp reveals a 3 cm mass which is firm, but not hard.  It does not appear to have any obvious fixation to the overlying skin.  The overlying skin appears normal, with normal hair growth.    Assessment and plan: This is a patient with an enlarging and symptomatic mass on his posterior scalp.  I have recommended excision, and the patient prefers general anesthetic, which I think is reasonable.  The etiology of this mass is not entirely obvious.  This could conceivably be a lymph node, a deep lipoma, a pilar cyst, or a sebaceous cyst.  We discussed the procedure, along with its risks and complications, in detail.  I would anticipate a prone positioning under general anesthetic.  We will work on getting surgery scheduled for the patient in the near future.    Jere Vale MD  [facility]    Please route or send letter to:  Referring Provider    
No

## 2025-03-13 NOTE — TELEPHONE ENCOUNTER
Type of surgery: EXCISION MASS, HEAD   Location of surgery: [facility]  Date and time of surgery: 5-15-25, 12 PM  Surgeon: DR GAUTAM   Pre-Op Appt Date: PATIENT TO SCHEDULE  Post-Op Appt Date: NA   Packet sent out:  GIVEN TO PATIENT   Pre-cert/Authorization completed:  Not Applicable  Date: 3-13-25      EXCISION MASS, HEAD GENERAL PT INST TO HAVE H&P 45 MINS REQ   PA ASSIST DFB ALW   (35 mins avg)

## 2025-03-13 NOTE — LETTER
March 13, 2025      Shakila Irene PA-C  [address] [place] [address]      RE:   Jacinto Cruz IV 1985      Dear Colleague,    Thank you for referring your patient, Jacinto Cruz IV, to [facility]. Please see a copy of my visit note below.    This is a 39-year-old patient who presents with an enlarging mass on his right posterior scalp.  He has noticed this for at least a couple of years.  It is getting increasingly painful and enlarging.  Pressure on the area causes headaches.  It has never drained.  It has not become inflamed.    10 point review of systems is negative other than those issues noted above.    Physical exam:  The patient is in no apparent distress.  Breathing is nonlabored.  The right posterior scalp reveals a 3 cm mass which is firm, but not hard.  It does not appear to have any obvious fixation to the overlying skin.  The overlying skin appears normal, with normal hair growth.    Assessment and plan: This is a patient with an enlarging and symptomatic mass on his posterior scalp.  I have recommended excision, and the patient prefers general anesthetic, which I think is reasonable.  The etiology of this mass is not entirely obvious.  This could conceivably be a lymph node, a deep lipoma, a pilar cyst, or a sebaceous cyst.  We discussed the procedure, along with its risks and complications, in detail.  I would anticipate a prone positioning under general anesthetic.  We will work on getting surgery scheduled for the patient in the near future.    Again, thank you for allowing me to participate in the care of your patient.      Sincerely,      Jere Vale MD

## 2025-03-13 NOTE — LETTER
Showering Before Surgery      Your surgeon has asked you to take 2 showers before surgery.    Why is this important?  It is normal for bacteria (germs) to be on your skin. The skin protects us from these germs. When you have surgery, we cut the skin. Sometimes germs get into the cuts and cause infection (illness caused by germs). By following the instructions below and using special soap, you will lower the number of germs on your skin. This decreases your chance of infection.  Special soap  Buy or get 8 ounces of antiseptic surgical soap called 4% CHG. Common name brands of this soap are Hibiclens and Exidine.  You can find it at your local pharmacy, clinic or retail store. If you have trouble, ask your pharmacist to help you find the right substitute.  A note about shaving:  Do not shave within 12 inches of your incision (surgical cut) area for at least 3 days before surgery. Shaving can make small cuts in the skin. This puts you at a higher risk of infection.  Items you will need for each shower:  1 newly washed towel  4 ounces of one of the above soaps  Clean pajamas or clothes to change into    Follow these instructions:  Follow these steps the evening before surgery and the morning of surgery.  Wash your hair and body with your regular shampoo and soap. Make sure you rinse the shampoo and soap from your hair and body.  Using clean hands, apply about 2 ounces of soap gently on your skin from your ear lobes to your toes. Use on your groin area last. Do not use this soap on your face or head. If you get any soap in your eyes, ears or mouth, rinse right away.  Repeat step 2. It is very important to let the soap stay on your skin for at least 1 minute.    Rinse well and dry off using a clean towel.    If you feel any tingling, itching or other irritation, rinse right away. It is normal to feel some coolness on the skin after using the antiseptic soap. Your skin may feel a bit dry after the shower, but do not use  any lotions, creams or moisturizers. Do not use hair spray or other products in your hair.  5.  Dress in freshly washed clothes or pajamas. Use fresh pillowcases and sheets on your bed.    Repeat these steps the morning of surgery.  If you have any questions about showering or an allergy to CHG soap, please call your surgery center.    For informational purposes only. Not to replace the advice of your health care provider. Copyright   2012 [facility]. All rights reserved. Clinically reviewed by [employer]. [employer] 529292 - REV 12          Electronically signed

## 2025-03-13 NOTE — LETTER
2025       Jacinto Cruz IV  [address] [facility] [place] [address]     RE: [id number]  : 1985    Jacinto Cruz IV has been scheduled for surgery on 5-15-25 at 12:00 pm at [facility] with Dr Jere Vale.  The hospital is located at [address] in [place].    Please check in at the Surgery reception desk at 10:00 am. This is located in the back of the hospital on the East side, just past the Emergency Room entrance.     DO NOT EAT OR DRINK ANYTHING 8 HOURS BEFORE YOUR ARRIVAL TIME.   You may have sips of clear liquids up until 2 hours before your arrival time. If you have been advised to take your medication, please do this early in the morning with just sips of clear liquid.        Hospital regulations require an updated pre-operative examination to be completed within 30 days of the procedure. This can be done by your primary care provider. Please ask them to fax documentation to [phone]. We also recommend you bring a copy with you.     You should shower before your surgery with Hibiclens or Exidine soap.  This can be found at your local pharmacy or you can pick it up from our office for free.  Please call our office if you have any questions.     You will be required to have an Adult (friend or family member) drive you home after your surgery and arrange for an adult to stay with you until the next morning.     You will receive several calls from our staff 3-7 days prior to your scheduled procedure with further details and to answer any questions you may have.    It is sometimes necessary to adjust the surgery schedule due to emergencies and additions to the schedule.  If your surgery is affected by this, we greatly appreciate your flexibility and understanding in this matter    It is best if you call regarding post-operative questions between the hours of 8:00 am & 3:00 pm Monday-Friday, so you have access to the daytime care team that know you  best.  Prescription refills are accepted during regular office hours only.    Please do not bring any Disability or FMLA papers to the hospital.  They need to be either faxed ([phone]), mailed or hand delivered to our office by you or a family member for completion.  Please allow 14 business days to complete paperwork.        If you have questions or concerns, please contact our office at [phone].               Electronically signed

## 2025-03-29 ENCOUNTER — ANCILLARY PROCEDURE (OUTPATIENT)
Dept: GENERAL RADIOLOGY | Facility: CLINIC | Age: 40
End: 2025-03-29
Attending: FAMILY MEDICINE
Payer: COMMERCIAL

## 2025-03-29 ENCOUNTER — OFFICE VISIT (OUTPATIENT)
Dept: URGENT CARE | Facility: URGENT CARE | Age: 40
End: 2025-03-29
Payer: COMMERCIAL

## 2025-03-29 VITALS
SYSTOLIC BLOOD PRESSURE: 143 MMHG | WEIGHT: 179 LBS | TEMPERATURE: 98.4 F | HEART RATE: 65 BPM | BODY MASS INDEX: 24.97 KG/M2 | DIASTOLIC BLOOD PRESSURE: 94 MMHG | OXYGEN SATURATION: 98 % | RESPIRATION RATE: 16 BRPM

## 2025-03-29 DIAGNOSIS — R10.9 RIGHT SIDED ABDOMINAL PAIN: ICD-10-CM

## 2025-03-29 DIAGNOSIS — R10.9 RIGHT SIDED ABDOMINAL PAIN: Primary | ICD-10-CM

## 2025-03-29 LAB
ALBUMIN SERPL-MCNC: 3.9 G/DL (ref 3.4–5)
ALBUMIN UR-MCNC: NEGATIVE MG/DL
ALP SERPL-CCNC: 78 U/L (ref 40–150)
ALT SERPL W P-5'-P-CCNC: 23 U/L (ref 0–70)
ANION GAP SERPL CALCULATED.3IONS-SCNC: 6 MMOL/L (ref 3–14)
APPEARANCE UR: CLEAR
AST SERPL W P-5'-P-CCNC: 29 U/L (ref 0–45)
BASOPHILS # BLD AUTO: 0 10E3/UL (ref 0–0.2)
BASOPHILS NFR BLD AUTO: 1 %
BILIRUB SERPL-MCNC: 0.7 MG/DL (ref 0.2–1.3)
BILIRUB UR QL STRIP: NEGATIVE
BUN SERPL-MCNC: 15 MG/DL (ref 7–30)
CALCIUM SERPL-MCNC: 9.5 MG/DL (ref 8.5–10.1)
CHLORIDE BLD-SCNC: 108 MMOL/L (ref 94–109)
CO2 SERPL-SCNC: 28 MMOL/L (ref 20–32)
COLOR UR AUTO: YELLOW
CREAT SERPL-MCNC: 1 MG/DL (ref 0.66–1.25)
EGFRCR SERPLBLD CKD-EPI 2021: >90 ML/MIN/1.73M2
EOSINOPHIL # BLD AUTO: 0.1 10E3/UL (ref 0–0.7)
EOSINOPHIL NFR BLD AUTO: 2 %
ERYTHROCYTE [DISTWIDTH] IN BLOOD BY AUTOMATED COUNT: 14.3 % (ref 10–15)
GLUCOSE BLD-MCNC: 87 MG/DL (ref 70–99)
GLUCOSE UR STRIP-MCNC: NEGATIVE MG/DL
HCT VFR BLD AUTO: 42.4 % (ref 40–53)
HGB BLD-MCNC: 13.7 G/DL (ref 13.3–17.7)
HGB UR QL STRIP: NEGATIVE
IMM GRANULOCYTES # BLD: 0 10E3/UL
IMM GRANULOCYTES NFR BLD: 0 %
KETONES UR STRIP-MCNC: NEGATIVE MG/DL
LEUKOCYTE ESTERASE UR QL STRIP: NEGATIVE
LYMPHOCYTES # BLD AUTO: 2.6 10E3/UL (ref 0.8–5.3)
LYMPHOCYTES NFR BLD AUTO: 37 %
MCH RBC QN AUTO: 28.1 PG (ref 26.5–33)
MCHC RBC AUTO-ENTMCNC: 32.3 G/DL (ref 31.5–36.5)
MCV RBC AUTO: 87 FL (ref 78–100)
MONOCYTES # BLD AUTO: 0.5 10E3/UL (ref 0–1.3)
MONOCYTES NFR BLD AUTO: 7 %
NEUTROPHILS # BLD AUTO: 3.8 10E3/UL (ref 1.6–8.3)
NEUTROPHILS NFR BLD AUTO: 54 %
NITRATE UR QL: NEGATIVE
PH UR STRIP: 7 [PH] (ref 5–7)
PLATELET # BLD AUTO: 273 10E3/UL (ref 150–450)
POTASSIUM BLD-SCNC: 4.8 MMOL/L (ref 3.4–5.3)
PROT SERPL-MCNC: 7.2 G/DL (ref 6.8–8.8)
RBC # BLD AUTO: 4.88 10E6/UL (ref 4.4–5.9)
SODIUM SERPL-SCNC: 142 MMOL/L (ref 135–145)
SP GR UR STRIP: 1.02 (ref 1–1.03)
UROBILINOGEN UR STRIP-ACNC: 0.2 E.U./DL
WBC # BLD AUTO: 7.1 10E3/UL (ref 4–11)

## 2025-03-29 PROCEDURE — 81003 URINALYSIS AUTO W/O SCOPE: CPT | Performed by: FAMILY MEDICINE

## 2025-03-29 PROCEDURE — 85025 COMPLETE CBC W/AUTO DIFF WBC: CPT | Performed by: FAMILY MEDICINE

## 2025-03-29 PROCEDURE — 99214 OFFICE O/P EST MOD 30 MIN: CPT | Performed by: FAMILY MEDICINE

## 2025-03-29 PROCEDURE — 80053 COMPREHEN METABOLIC PANEL: CPT | Performed by: FAMILY MEDICINE

## 2025-03-29 PROCEDURE — 36415 COLL VENOUS BLD VENIPUNCTURE: CPT | Performed by: FAMILY MEDICINE

## 2025-03-29 PROCEDURE — 74019 RADEX ABDOMEN 2 VIEWS: CPT | Mod: TC | Performed by: RADIOLOGY

## 2025-03-29 NOTE — PROGRESS NOTES
SUBJECTIVE:  Chief Complaint   Patient presents with    Abdominal Pain     3 days, right mid stomach, fell up the stair-certain movement cause pain, deep breaths-laughing, urinating a the end      Jacinto Cruz IV is a 39 year old male who presents with a chief complaint of abdominal pain x 3 days (Thursday 3/27)    Fell while going up the stairs, arms caught handrails and knee hit the stairs.  Did not hit abdomen on stairs but noted pain right away acutely on abdominal area - right sided.  This has persisted.  Worse with movement.  Unable to do sit up or push up.    Patient has not tried anything yet  No nausea, vomiting or diarrhea  No bruise  Denies dysuria symptoms but endorsed abdominal pain after finishes urination    Past Medical History:   Diagnosis Date    Gastro-oesophageal reflux disease      Current Outpatient Medications   Medication Sig Dispense Refill    EPINEPHrine (ANY BX GENERIC EQUIV) 0.3 MG/0.3ML injection 2-pack Inject 0.3 mLs (0.3 mg) into the muscle as needed for anaphylaxis. For anaphylaxis 0.3 mL 1     Social History     Tobacco Use    Smoking status: Former    Smokeless tobacco: Former    Tobacco comments:     quit 5/8/14 at 1230   Substance Use Topics    Alcohol use: Yes     Comment: rare       ROS:  Review of systems negative except as stated above.    EXAM:   BP (!) 143/94 (BP Location: Right arm, Patient Position: Sitting, Cuff Size: Adult Regular)   Pulse 65   Temp 98.4  F (36.9  C) (Tympanic)   Resp 16   Wt 81.2 kg (179 lb)   SpO2 98%   BMI 24.97 kg/m    GENERAL APPEARANCE: healthy, alert and no distress  ABD: soft, mild tenderness on mid right  EXTREMITIES: peripheral pulses normal  SKIN: no suspicious lesions or rashes  PSYCH:alert, affect bright    ABD Xray - scattered bowel gas and stool, no air-fluid level, no free air personally viewed by me    Results for orders placed or performed in visit on 03/29/25   Comprehensive metabolic panel     Status: Normal   Result Value  Ref Range    Sodium 142 135 - 145 mmol/L    Potassium 4.8 3.4 - 5.3 mmol/L    Chloride 108 94 - 109 mmol/L    Carbon Dioxide (CO2) 28 20 - 32 mmol/L    Anion Gap 6 3 - 14 mmol/L    Urea Nitrogen 15 7 - 30 mg/dL    Creatinine 1.00 0.66 - 1.25 mg/dL    GFR Estimate >90 >60 mL/min/1.73m2    Calcium 9.5 8.5 - 10.1 mg/dL    Glucose 87 70 - 99 mg/dL    Alkaline Phosphatase 78 40 - 150 U/L    AST 29 0 - 45 U/L    ALT 23 0 - 70 U/L    Protein Total 7.2 6.8 - 8.8 g/dL    Albumin 3.9 3.4 - 5.0 g/dL    Bilirubin Total 0.7 0.2 - 1.3 mg/dL   CBC with platelets and differential     Status: None   Result Value Ref Range    WBC Count 7.1 4.0 - 11.0 10e3/uL    RBC Count 4.88 4.40 - 5.90 10e6/uL    Hemoglobin 13.7 13.3 - 17.7 g/dL    Hematocrit 42.4 40.0 - 53.0 %    MCV 87 78 - 100 fL    MCH 28.1 26.5 - 33.0 pg    MCHC 32.3 31.5 - 36.5 g/dL    RDW 14.3 10.0 - 15.0 %    Platelet Count 273 150 - 450 10e3/uL    % Neutrophils 54 %    % Lymphocytes 37 %    % Monocytes 7 %    % Eosinophils 2 %    % Basophils 1 %    % Immature Granulocytes 0 %    Absolute Neutrophils 3.8 1.6 - 8.3 10e3/uL    Absolute Lymphocytes 2.6 0.8 - 5.3 10e3/uL    Absolute Monocytes 0.5 0.0 - 1.3 10e3/uL    Absolute Eosinophils 0.1 0.0 - 0.7 10e3/uL    Absolute Basophils 0.0 0.0 - 0.2 10e3/uL    Absolute Immature Granulocytes 0.0 <=0.4 10e3/uL   UA Macroscopic with reflex to Microscopic and Culture - Lab Collect     Status: Normal    Specimen: Urine, Midstream   Result Value Ref Range    Color Urine Yellow Colorless, Straw, Light Yellow, Yellow    Appearance Urine Clear Clear    Glucose Urine Negative Negative mg/dL    Bilirubin Urine Negative Negative    Ketones Urine Negative Negative mg/dL    Specific Gravity Urine 1.020 1.003 - 1.035    Blood Urine Negative Negative    pH Urine 7.0 5.0 - 7.0    Protein Albumin Urine Negative Negative mg/dL    Urobilinogen Urine 0.2 0.2, 1.0 E.U./dL    Nitrite Urine Negative Negative    Leukocyte Esterase Urine Negative Negative     Narrative    Microscopic not indicated   CBC with platelets and differential     Status: None    Narrative    The following orders were created for panel order CBC with platelets and differential.  Procedure                               Abnormality         Status                     ---------                               -----------         ------                     CBC with platelets and ...[[id number]]                      Final result                 Please view results for these tests on the individual orders.         ASSESSMENT/PLAN:  (R10.9) Right sided abdominal pain  (primary encounter diagnosis)  Plan: CBC with platelets and differential,         Comprehensive metabolic panel, UA Macroscopic         with reflex to Microscopic and Culture - Lab         Collect, XR Abdomen 2 Views            Unclear etiology for abdominal pain symptoms, patient is not in acute distress and non-toxic appearing.  Reviewed labs and xray and reassuring.  Encourage tylenol, ibuprofen, plenty of fluids and rest.  Okay for warm pack/heat to area.  Will follow up on formal xray report and notify if any abnormalities.  To ER if worsening symptoms.    Recommend follow up with primary provider for recheck in 1 week    Colton Degroot MD  March 29, 2025 7:53 PM

## 2025-05-10 ENCOUNTER — OFFICE VISIT (OUTPATIENT)
Dept: URGENT CARE | Facility: URGENT CARE | Age: 40
End: 2025-05-10
Payer: COMMERCIAL

## 2025-05-10 VITALS
SYSTOLIC BLOOD PRESSURE: 127 MMHG | DIASTOLIC BLOOD PRESSURE: 87 MMHG | RESPIRATION RATE: 16 BRPM | TEMPERATURE: 97.5 F | HEART RATE: 64 BPM | OXYGEN SATURATION: 99 % | BODY MASS INDEX: 25.1 KG/M2 | WEIGHT: 180 LBS

## 2025-05-10 DIAGNOSIS — H65.91 OME (OTITIS MEDIA WITH EFFUSION), RIGHT: ICD-10-CM

## 2025-05-10 DIAGNOSIS — J02.9 PHARYNGITIS, UNSPECIFIED ETIOLOGY: Primary | ICD-10-CM

## 2025-05-10 LAB
DEPRECATED S PYO AG THROAT QL EIA: NEGATIVE
S PYO DNA THROAT QL NAA+PROBE: NOT DETECTED

## 2025-05-10 PROCEDURE — 87651 STREP A DNA AMP PROBE: CPT | Performed by: PHYSICIAN ASSISTANT

## 2025-05-10 PROCEDURE — 99213 OFFICE O/P EST LOW 20 MIN: CPT | Performed by: PHYSICIAN ASSISTANT

## 2025-05-10 RX ORDER — AZITHROMYCIN 250 MG/1
TABLET, FILM COATED ORAL
Qty: 6 TABLET | Refills: 0 | Status: SHIPPED | OUTPATIENT
Start: 2025-05-10

## 2025-05-10 NOTE — PROGRESS NOTES
Urgent Care Clinic Visit    Chief Complaint   Patient presents with    Pharyngitis     1.5 week, bilateral ear pain               5/10/2025    10:06 AM   Additional Questions   Roomed by Emely GREENBERG

## 2025-05-10 NOTE — PROGRESS NOTES
SUBJECTIVE:  Jacinto Cruz IV is a 40 year old male    Past Medical History:   Diagnosis Date    Gastro-oesophageal reflux disease      Patient Active Problem List   Diagnosis    CARDIOVASCULAR SCREENING; LDL GOAL LESS THAN 130    Allergic rhinitis     Patient Active Problem List   Diagnosis    CARDIOVASCULAR SCREENING; LDL GOAL LESS THAN 130    Allergic rhinitis     Current Outpatient Medications   Medication Sig Dispense Refill    EPINEPHrine (ANY BX GENERIC EQUIV) 0.3 MG/0.3ML injection 2-pack Inject 0.3 mLs (0.3 mg) into the muscle as needed for anaphylaxis. For anaphylaxis 0.3 mL 1     No current facility-administered medications for this visit.     Social History     Socioeconomic History    Marital status: Single     Spouse name: Not on file    Number of children: Not on file    Years of education: Not on file    Highest education level: Not on file   Occupational History    Not on file   Tobacco Use    Smoking status: Former    Smokeless tobacco: Former    Tobacco comments:     quit 5/8/14 at 1230   Vaping Use    Vaping status: Never Used   Substance and Sexual Activity    Alcohol use: Yes     Comment: rare    Drug use: No    Sexual activity: Yes     Partners: Female   Other Topics Concern    Parent/sibling w/ CABG, MI or angioplasty before 65F 55M? Not Asked   Social History Narrative    Lives with parents    Work-  [employer]    No children    Two dogs     Social Drivers of Health     Financial Resource Strain: Low Risk  (3/7/2025)    Financial Resource Strain     Within the past 12 months, have you or your family members you live with been unable to get utilities (heat, electricity) when it was really needed?: No   Food Insecurity: Low Risk  (3/7/2025)    Food Insecurity     Within the past 12 months, did you worry that your food would run out before you got money to buy more?: No     Within the past 12 months, did the food you bought just not last and you didn t have money to get more?: No    Transportation Needs: Low Risk  (3/7/2025)    Transportation Needs     Within the past 12 months, has lack of transportation kept you from medical appointments, getting your medicines, non-medical meetings or appointments, work, or from getting things that you need?: No   Physical Activity: Not on file   Stress: Not on file   Social Connections: Not on file   Interpersonal Safety: Low Risk  (3/7/2025)    Interpersonal Safety     Do you feel physically and emotionally safe where you currently live?: Yes     Within the past 12 months, have you been hit, slapped, kicked or otherwise physically hurt by someone?: No     Within the past 12 months, have you been humiliated or emotionally abused in other ways by your partner or ex-partner?: No   Housing Stability: Low Risk  (3/7/2025)    Housing Stability     Do you have housing? : Yes     Are you worried about losing your housing?: No     ROS  negative other than stated above    Exam:  {:511111}    ***     rashes    Strep is negative.    assessment/plan:  (J02.9) Pharyngitis, unspecified etiology  (primary encounter diagnosis)  Comment:   Plan: Streptococcus A Rapid Screen w/Reflex to PCR -         Clinic Collect, Group A Streptococcus PCR         Throat Swab        Patient with 1 and half week history of mild URI related symptoms along with sore throat and ear pain.  Strep was negative and culture pending.  Does have right-sided otitis media.  Zithromax as directed.  Patient notified of his upcoming procedure remove the mass out of hi s.  Advised over-the-counter meds for symptomatic relief.  Follow-up with primary symptoms worsen or new symptoms develop.    (H65.91) OME (otitis media with effusion), right  Comment:   Plan: azithromycin (ZITHROMAX) 250 MG tablet

## 2025-05-15 ENCOUNTER — ANESTHESIA EVENT (OUTPATIENT)
Dept: SURGERY | Facility: CLINIC | Age: 40
End: 2025-05-15
Payer: COMMERCIAL

## 2025-05-15 ENCOUNTER — HOSPITAL ENCOUNTER (OUTPATIENT)
Facility: CLINIC | Age: 40
Discharge: HOME | End: 2025-05-15
Attending: SURGERY | Admitting: SURGERY
Payer: COMMERCIAL

## 2025-05-15 ENCOUNTER — ANESTHESIA (OUTPATIENT)
Dept: SURGERY | Facility: CLINIC | Age: 40
End: 2025-05-15
Payer: COMMERCIAL

## 2025-05-15 VITALS
SYSTOLIC BLOOD PRESSURE: 134 MMHG | WEIGHT: 178.2 LBS | BODY MASS INDEX: 24.85 KG/M2 | OXYGEN SATURATION: 96 % | HEART RATE: 65 BPM | RESPIRATION RATE: 18 BRPM | TEMPERATURE: 97.3 F | DIASTOLIC BLOOD PRESSURE: 92 MMHG

## 2025-05-15 DIAGNOSIS — R22.9 MASS OF SKIN: ICD-10-CM

## 2025-05-15 DIAGNOSIS — R22.0 SCALP MASS: Primary | ICD-10-CM

## 2025-05-15 PROCEDURE — 250N000025 HC SEVOFLURANE, PER MIN: Performed by: SURGERY

## 2025-05-15 PROCEDURE — 710N000012 HC RECOVERY PHASE 2, PER MINUTE: Performed by: SURGERY

## 2025-05-15 PROCEDURE — 88304 TISSUE EXAM BY PATHOLOGIST: CPT | Mod: TC | Performed by: SURGERY

## 2025-05-15 PROCEDURE — 250N000009 HC RX 250: Performed by: NURSE ANESTHETIST, CERTIFIED REGISTERED

## 2025-05-15 PROCEDURE — 250N000011 HC RX IP 250 OP 636: Performed by: SURGERY

## 2025-05-15 PROCEDURE — 710N000009 HC RECOVERY PHASE 1, LEVEL 1, PER MIN: Performed by: SURGERY

## 2025-05-15 PROCEDURE — 258N000003 HC RX IP 258 OP 636: Performed by: NURSE ANESTHETIST, CERTIFIED REGISTERED

## 2025-05-15 PROCEDURE — 999N000141 HC STATISTIC PRE-PROCEDURE NURSING ASSESSMENT: Performed by: SURGERY

## 2025-05-15 PROCEDURE — 250N000011 HC RX IP 250 OP 636: Performed by: NURSE ANESTHETIST, CERTIFIED REGISTERED

## 2025-05-15 PROCEDURE — 360N000075 HC SURGERY LEVEL 2, PER MIN: Performed by: SURGERY

## 2025-05-15 PROCEDURE — 250N000009 HC RX 250: Performed by: SURGERY

## 2025-05-15 PROCEDURE — 370N000017 HC ANESTHESIA TECHNICAL FEE, PER MIN: Performed by: SURGERY

## 2025-05-15 PROCEDURE — 272N000001 HC OR GENERAL SUPPLY STERILE: Performed by: SURGERY

## 2025-05-15 RX ORDER — LABETALOL HYDROCHLORIDE 5 MG/ML
10 INJECTION, SOLUTION INTRAVENOUS
Status: DISCONTINUED | OUTPATIENT
Start: 2025-05-15 | End: 2025-05-15 | Stop reason: HOSPADM

## 2025-05-15 RX ORDER — DEXAMETHASONE SODIUM PHOSPHATE 4 MG/ML
INJECTION, SOLUTION INTRA-ARTICULAR; INTRALESIONAL; INTRAMUSCULAR; INTRAVENOUS; SOFT TISSUE PRN
Status: DISCONTINUED | OUTPATIENT
Start: 2025-05-15 | End: 2025-05-15

## 2025-05-15 RX ORDER — MAGNESIUM SULFATE HEPTAHYDRATE 40 MG/ML
2 INJECTION, SOLUTION INTRAVENOUS
Status: DISCONTINUED | OUTPATIENT
Start: 2025-05-15 | End: 2025-05-15 | Stop reason: HOSPADM

## 2025-05-15 RX ORDER — CEFAZOLIN SODIUM/WATER 2 G/20 ML
2 SYRINGE (ML) INTRAVENOUS SEE ADMIN INSTRUCTIONS
Status: DISCONTINUED | OUTPATIENT
Start: 2025-05-15 | End: 2025-05-15 | Stop reason: HOSPADM

## 2025-05-15 RX ORDER — ONDANSETRON 2 MG/ML
4 INJECTION INTRAMUSCULAR; INTRAVENOUS EVERY 30 MIN PRN
Status: DISCONTINUED | OUTPATIENT
Start: 2025-05-15 | End: 2025-05-15 | Stop reason: HOSPADM

## 2025-05-15 RX ORDER — DEXAMETHASONE SODIUM PHOSPHATE 4 MG/ML
4 INJECTION, SOLUTION INTRA-ARTICULAR; INTRALESIONAL; INTRAMUSCULAR; INTRAVENOUS; SOFT TISSUE
Status: DISCONTINUED | OUTPATIENT
Start: 2025-05-15 | End: 2025-05-15 | Stop reason: HOSPADM

## 2025-05-15 RX ORDER — FENTANYL CITRATE 50 UG/ML
50 INJECTION, SOLUTION INTRAMUSCULAR; INTRAVENOUS EVERY 5 MIN PRN
Status: DISCONTINUED | OUTPATIENT
Start: 2025-05-15 | End: 2025-05-15 | Stop reason: HOSPADM

## 2025-05-15 RX ORDER — ALBUTEROL SULFATE 0.83 MG/ML
2.5 SOLUTION RESPIRATORY (INHALATION) EVERY 4 HOURS PRN
Status: DISCONTINUED | OUTPATIENT
Start: 2025-05-15 | End: 2025-05-15 | Stop reason: HOSPADM

## 2025-05-15 RX ORDER — ONDANSETRON 4 MG/1
4 TABLET, ORALLY DISINTEGRATING ORAL EVERY 30 MIN PRN
Status: DISCONTINUED | OUTPATIENT
Start: 2025-05-15 | End: 2025-05-15 | Stop reason: HOSPADM

## 2025-05-15 RX ORDER — SODIUM CHLORIDE, SODIUM LACTATE, POTASSIUM CHLORIDE, CALCIUM CHLORIDE 600; 310; 30; 20 MG/100ML; MG/100ML; MG/100ML; MG/100ML
INJECTION, SOLUTION INTRAVENOUS CONTINUOUS PRN
Status: DISCONTINUED | OUTPATIENT
Start: 2025-05-15 | End: 2025-05-15

## 2025-05-15 RX ORDER — MAGNESIUM HYDROXIDE 1200 MG/15ML
LIQUID ORAL PRN
Status: DISCONTINUED | OUTPATIENT
Start: 2025-05-15 | End: 2025-05-15 | Stop reason: HOSPADM

## 2025-05-15 RX ORDER — LIDOCAINE HYDROCHLORIDE 20 MG/ML
INJECTION, SOLUTION INFILTRATION; PERINEURAL PRN
Status: DISCONTINUED | OUTPATIENT
Start: 2025-05-15 | End: 2025-05-15

## 2025-05-15 RX ORDER — NALOXONE HYDROCHLORIDE 0.4 MG/ML
0.1 INJECTION, SOLUTION INTRAMUSCULAR; INTRAVENOUS; SUBCUTANEOUS
Status: DISCONTINUED | OUTPATIENT
Start: 2025-05-15 | End: 2025-05-15 | Stop reason: HOSPADM

## 2025-05-15 RX ORDER — HYDRALAZINE HYDROCHLORIDE 20 MG/ML
5-10 INJECTION INTRAMUSCULAR; INTRAVENOUS EVERY 10 MIN PRN
Status: DISCONTINUED | OUTPATIENT
Start: 2025-05-15 | End: 2025-05-15 | Stop reason: HOSPADM

## 2025-05-15 RX ORDER — OXYCODONE HYDROCHLORIDE 5 MG/1
5-10 TABLET ORAL EVERY 4 HOURS PRN
Qty: 6 TABLET | Refills: 0 | Status: SHIPPED | OUTPATIENT
Start: 2025-05-15

## 2025-05-15 RX ORDER — KETOROLAC TROMETHAMINE 30 MG/ML
INJECTION, SOLUTION INTRAMUSCULAR; INTRAVENOUS PRN
Status: DISCONTINUED | OUTPATIENT
Start: 2025-05-15 | End: 2025-05-15

## 2025-05-15 RX ORDER — ONDANSETRON 2 MG/ML
INJECTION INTRAMUSCULAR; INTRAVENOUS PRN
Status: DISCONTINUED | OUTPATIENT
Start: 2025-05-15 | End: 2025-05-15

## 2025-05-15 RX ORDER — FENTANYL CITRATE 50 UG/ML
INJECTION, SOLUTION INTRAMUSCULAR; INTRAVENOUS PRN
Status: DISCONTINUED | OUTPATIENT
Start: 2025-05-15 | End: 2025-05-15

## 2025-05-15 RX ORDER — ONDANSETRON 4 MG/1
4 TABLET, FILM COATED ORAL EVERY 6 HOURS PRN
Qty: 4 TABLET | Refills: 0 | Status: SHIPPED | OUTPATIENT
Start: 2025-05-15

## 2025-05-15 RX ORDER — FENTANYL CITRATE 50 UG/ML
25 INJECTION, SOLUTION INTRAMUSCULAR; INTRAVENOUS EVERY 5 MIN PRN
Status: DISCONTINUED | OUTPATIENT
Start: 2025-05-15 | End: 2025-05-15 | Stop reason: HOSPADM

## 2025-05-15 RX ORDER — GINSENG 100 MG
CAPSULE ORAL PRN
Status: DISCONTINUED | OUTPATIENT
Start: 2025-05-15 | End: 2025-05-15 | Stop reason: HOSPADM

## 2025-05-15 RX ORDER — OXYCODONE HYDROCHLORIDE 5 MG/1
5 TABLET ORAL
Status: DISCONTINUED | OUTPATIENT
Start: 2025-05-15 | End: 2025-05-15 | Stop reason: HOSPADM

## 2025-05-15 RX ORDER — SODIUM CHLORIDE, SODIUM LACTATE, POTASSIUM CHLORIDE, CALCIUM CHLORIDE 600; 310; 30; 20 MG/100ML; MG/100ML; MG/100ML; MG/100ML
INJECTION, SOLUTION INTRAVENOUS CONTINUOUS
Status: DISCONTINUED | OUTPATIENT
Start: 2025-05-15 | End: 2025-05-15 | Stop reason: HOSPADM

## 2025-05-15 RX ORDER — OXYCODONE HYDROCHLORIDE 5 MG/1
10 TABLET ORAL
Status: DISCONTINUED | OUTPATIENT
Start: 2025-05-15 | End: 2025-05-15 | Stop reason: HOSPADM

## 2025-05-15 RX ORDER — PROPOFOL 10 MG/ML
INJECTION, EMULSION INTRAVENOUS PRN
Status: DISCONTINUED | OUTPATIENT
Start: 2025-05-15 | End: 2025-05-15

## 2025-05-15 RX ORDER — CEFAZOLIN SODIUM/WATER 2 G/20 ML
2 SYRINGE (ML) INTRAVENOUS
Status: COMPLETED | OUTPATIENT
Start: 2025-05-15 | End: 2025-05-15

## 2025-05-15 RX ORDER — KETOROLAC TROMETHAMINE 15 MG/ML
15 INJECTION, SOLUTION INTRAMUSCULAR; INTRAVENOUS
Status: DISCONTINUED | OUTPATIENT
Start: 2025-05-15 | End: 2025-05-15 | Stop reason: HOSPADM

## 2025-05-15 RX ORDER — HYDROMORPHONE HYDROCHLORIDE 1 MG/ML
0.5 INJECTION, SOLUTION INTRAMUSCULAR; INTRAVENOUS; SUBCUTANEOUS EVERY 5 MIN PRN
Status: DISCONTINUED | OUTPATIENT
Start: 2025-05-15 | End: 2025-05-15 | Stop reason: HOSPADM

## 2025-05-15 RX ADMIN — KETOROLAC TROMETHAMINE 15 MG: 30 INJECTION, SOLUTION INTRAMUSCULAR at 11:16

## 2025-05-15 RX ADMIN — MIDAZOLAM 2 MG: 1 INJECTION INTRAMUSCULAR; INTRAVENOUS at 11:09

## 2025-05-15 RX ADMIN — DEXAMETHASONE SODIUM PHOSPHATE 8 MG: 4 INJECTION, SOLUTION INTRA-ARTICULAR; INTRALESIONAL; INTRAMUSCULAR; INTRAVENOUS; SOFT TISSUE at 11:16

## 2025-05-15 RX ADMIN — ROCURONIUM BROMIDE 50 MG: 50 INJECTION, SOLUTION INTRAVENOUS at 11:16

## 2025-05-15 RX ADMIN — Medication 2 G: at 11:11

## 2025-05-15 RX ADMIN — SODIUM CHLORIDE, SODIUM LACTATE, POTASSIUM CHLORIDE, AND CALCIUM CHLORIDE: .6; .31; .03; .02 INJECTION, SOLUTION INTRAVENOUS at 11:11

## 2025-05-15 RX ADMIN — ONDANSETRON 4 MG: 2 INJECTION INTRAMUSCULAR; INTRAVENOUS at 11:16

## 2025-05-15 RX ADMIN — LIDOCAINE HYDROCHLORIDE 50 MG: 20 INJECTION, SOLUTION INFILTRATION; PERINEURAL at 11:16

## 2025-05-15 RX ADMIN — FENTANYL CITRATE 100 MCG: 50 INJECTION INTRAMUSCULAR; INTRAVENOUS at 11:16

## 2025-05-15 RX ADMIN — PROPOFOL 200 MG: 10 INJECTION, EMULSION INTRAVENOUS at 11:16

## 2025-05-15 RX ADMIN — SUGAMMADEX 200 MG: 100 INJECTION, SOLUTION INTRAVENOUS at 11:58

## 2025-05-15 ASSESSMENT — ACTIVITIES OF DAILY LIVING (ADL)
ADLS_ACUITY_SCORE: 15

## 2025-05-15 NOTE — ANESTHESIA POSTPROCEDURE EVALUATION
Patient: Jacinto Cruz IV    Procedure: Procedure(s):  EXCISION HEAD MASS       Anesthesia Type:  General    Note:  Disposition: Outpatient   Postop Pain Control: Uneventful            Sign Out: Well controlled pain   PONV: No   Neuro/Psych: Uneventful            Sign Out: Acceptable/Baseline neuro status   Airway/Respiratory: Uneventful            Sign Out: Acceptable/Baseline resp. status   CV/Hemodynamics: Uneventful            Sign Out: Acceptable CV status   Other NRE: NONE   DID A NON-ROUTINE EVENT OCCUR? No           Last vitals:  Vitals Value Taken Time   /94 05/15/25 13:15   Temp 96.9  F (36.1  C) 05/15/25 13:00   Pulse 59 05/15/25 13:17   Resp 17 05/15/25 13:17   SpO2 98 % 05/15/25 13:17   Vitals shown include unfiled device data.    Electronically Signed By: Tristen Whitman MD  May 15, 2025  1:34 PM

## 2025-05-15 NOTE — ANESTHESIA CARE TRANSFER NOTE
Patient: Jacinto Cruz IV    Procedure: Procedure(s):  EXCISION HEAD MASS       Diagnosis: Mass of skin [R22.9]  Diagnosis Additional Information: No value filed.    Anesthesia Type:   General     Note:    Oropharynx: oropharynx clear of all foreign objects and spontaneously breathing  Level of Consciousness: awake  Oxygen Supplementation: face mask  Level of Supplemental Oxygen (L/min / FiO2): 6  Independent Airway: airway patency satisfactory and stable  Dentition: dentition unchanged  Vital Signs Stable: post-procedure vital signs reviewed and stable  Report to RN Given: handoff report given  Patient transferred to: PACU    Handoff Report: Identifed the Patient, Identified the Reponsible Provider, Reviewed the pertinent medical history, Discussed the surgical course, Reviewed Intra-OP anesthesia mangement and issues during anesthesia, Set expectations for post-procedure period and Allowed opportunity for questions and acknowledgement of understanding      Vitals:  Vitals Value Taken Time   /100 05/15/25 12:12   Temp     Pulse 63 05/15/25 12:13   Resp 16 05/15/25 12:13   SpO2 100 % 05/15/25 12:13   Vitals shown include unfiled device data.    Electronically Signed By: STACY Quintana CRNA  May 15, 2025  12:14 PM

## 2025-05-15 NOTE — OP NOTE
General Surgery Operative Note    Pre-operative diagnosis: Posterior scalp mass   Post-operative diagnosis: Posterior scalp mass (3.1 cm)   Procedure: Excision of 3.1 cm posterior scalp subfascial mass.   Surgeon: Jere Vale MD   Assistant(s): Kaleb Early PA-C  - the PA's assistance was medically necessary in providing adequate exposure in the operating field, maintaining hemostasis, cuttting suture, clamping and ligating blood vessels, and visualization of the anatomic structures throughout the surgical procedure.    Anesthesia: General    Estimated blood loss: 5 cc's   Drains placed: None   Complications:  None   Findings:  Subfascial scalp mass consisting of well marginated fat.     Indications for operation: This is a 40-year-old gentleman with an enlarging and symptomatic posterior scalp mass.  Excision was recommended.  The patient requested general anesthesia.  We discussed the procedure, along with its risks and complications, in detail.  The patient agreed to proceed.    Details of the operation: After informed consent, the patient was taken to the operating room, where he underwent satisfactory induction of general anesthesia.  The patient was placed in a prone position and the hair over the palpable mass was trimmed.  The patient was sterilely prepped and draped and local anesthetic was injected in the area.  An elliptical incision was then made and dissection was carried down through the subcutaneous tissue.  We then came down to the fascia and opened this, exposing a fatty mass underneath.  This was well marginated and was carefully dissected out from surrounding tissue intact.  Hemostasis was achieved using electrocautery.  The incision was closed using vertical mattress and simple 3-0 nylon sutures.  There was excellent hemostasis.    The patient tolerated the procedure well and was transferred to the recovery room in satisfactory condition.  Sponge and needle counts were correct at the  close of the case.    Specimens:   ID Type Source Tests Collected by Time Destination   1 : POSTERIOR SCALP MASS Tissue Scalp SURGICAL PATHOLOGY EXAM Jere Vale MD 5/15/2025 11:45 AM            Jere Vale MD

## 2025-05-15 NOTE — ANESTHESIA PROCEDURE NOTES
Airway       Patient location during procedure: OR       Procedure Start/Stop Times: 5/15/2025 11:18 AM  Staff -        CRNA: Luz Rios APRN CRNA       Performed By: CRNA  Consent for Airway        Urgency: elective  Indications and Patient Condition       Indications for airway management: chaitanya-procedural       Induction type:intravenous       Mask difficulty assessment: 1 - vent by mask    Final Airway Details       Final airway type: endotracheal airway       Successful airway: ETT - single and Oral  Endotracheal Airway Details        ETT size (mm): 8.0       Cuffed: yes       Successful intubation technique: direct laryngoscopy       DL Blade Type: Morales 2       Grade View of Cords: 1       Adjucts: stylet       Position: Right       Measured from: lips       Secured at (cm): 24       Bite block used: Soft    Post intubation assessment        Placement verified by: capnometry, equal breath sounds and chest rise        Number of attempts at approach: 1       Number of other approaches attempted: 0       Secured with: tape       Ease of procedure: easy       Dentition: Unchanged    Medication(s) Administered   Medication Administration Time: 5/15/2025 11:18 AM

## 2025-05-15 NOTE — DISCHARGE INSTRUCTIONS
HOME CARE FOLLOWING MINOR SURGERY  BASSEM Hernandes, RICK De C. Pratt, J. Shaheen    RESULTS:  If a biopsy of tissue was done, you may call for your final pathology report after 1p.m. two working days after surgery.  Otherwise, this will be reviewed with you at time of phone follow-up (described below).    INCISIONAL CARE:  If you have a dressing in place, keep clean and dry for 48 hours; you may replace the gauze if it becomes soiled.  After 48 hours you may remove the dressing and shower.  Do not submerse incision in water for 1 week.  If you have a Dermabond dressing (a type of skin glue), you may shower immediately.  Sutures which are beneath the skin will absorb and do not need to be removed.  Sutures you can see should be removed at your surgeon's office near 2 weeks postop, unless otherwise instructed.  If present, leave the steri-strips (white paper tapes) in place for 14 days after surgery.  If present, leave Dermabond glue in place until it wears/flakes off.  You may expect a small amount of drainage from your incision.  A lump/ridge under the incision is normal and will gradually resolve.  If it becomes red or very uncomfortable, contact the nurse at your surgeon's office to discuss whether this needs to be evaluated.    ACTIVITY:  Cautiously resume exercise and strenuous activities such as jogging, tennis, aerobics, etc. Also, be careful of stretching activities which affect the area of surgery for two weeks.    DIET:  Start with liquids and gradually resume your regular diet as tolerated.  Increased fluid intake is recommended. While taking pain medications, consider use of a stool softener, increase your fiber in your diet, or add a fiber supplement (like Metamucil, Citrucel) to help prevent constipation - a possible side effect of pain medications.    DISCOMFORT:  Local anesthetic placed at surgery should provide relief for 4-8 hours.  Begin taking pain pills before  discomfort is severe.  Take the pain medication with some food, when possible, to minimize side effects.  Intermittent use of ice packs may help during the first 1-3 weeks after surgery.  Expect gradual improvement.    Over-the-counter anti-inflammatory medications (i.e. Ibuprofen/Advil/Motrin or Naprosyn/Aleve) may be used per package instructions in addition to or while tapering off the narcotic pain medications to decrease swelling and sensitivity.  DO NOT TAKE these Anti-inflammatory medications if your primary physician has advised against doing so, or if you have acid reflux, ulcer, or bleeding disorder, or take blood-thinner medications.  Call your primary physician or the surgery office if you have medication questions.      FOLLOW-UP AFTER SURGERY:  -Our office will contact you approximately 2-3 weeks after surgery to check on your progress and answer any questions you may have.  If you are doing well, you will not need to return for an office appointment.  If any concerns are identified over the phone, we will help you make an appointment to see a provider.    -If you have not received a phone call, have any questions or concerns, or would like to be seen, please call us at [phone].  We are located at: [address], [address]; [place] [address]    -CONTACT US IF THE FOLLOWING DEVELOPS:   1. A fever that is above 101     2. Increased redness, warmth, drainage, bleeding, or swelling.   3. Pain that is not relieved by rest/ice and your prescription.   4.  Increasing pain after 48 hours.   5. Drainage that is thick, cloudy, yellow, green or white.   6. Any other questions or concerns.      FREQUENTLY ASKED QUESTIONS:    Q:  How should my incision look?    A:  Normally your incision will appear slightly swollen with light redness directly along the incision itself as it heals.  It may feel like a bump or ridge as the healing/scarring happens, and over time (3-4 months) this bump or ridge feeling  should slowly go away.  In general, clear or pink watery drainage can be normal at first as your incision heals, but should decrease over time.    Q:  How do I know if my incision is infected?  A:  Look at your incision for signs of infection, like redness around the incision spreading to surrounding skin, or drainage of cloudy or foul-smelling drainage.  If you feel warm, check your temperature to see if you are running a fever.    **If any of these things occur, please notify the nurse at our office.  We may need you to come into the office for an incision check.      Q:  How do I take care of my incision?  A:  If you have a dressing in place - Starting the day after surgery, replace the dressing 1-2 times a day until there is no further drainage from the incision.  At that time, a dressing is no longer needed.  Try to minimize tape on the skin if irritation is occurring at the tape sites.  If you have significant irritation from tape on the skin, please call the office to discuss other method of dressing your incision.    Small pieces of tape called  steri-strips  may be present directly overlying your incision; these may be removed 10 days after surgery unless otherwise specified by your surgeon.  If these tapes start to loosen at the ends, you may trim them back until they fall off or are removed.    A:  If you had  Dermabond  tissue glue used as a dressing (this causes your incision to look shiny with a clear covering over it) - This type of dressing wears off with time and does not require more dressings over the top unless it is draining around the glue as it wears off.  Do not apply ointments or lotions over the incisions until the glue has completely worn off.    Q:  There is a piece of tape or a sticky  lead  still on my skin.  Can I remove this?  A:  Sometimes the sticky  leads  used for monitoring during surgery or for evaluation in the emergency department are not all removed while you are in the  hospital.  These sometimes have a tab or metal dot on them.  You can easily remove these on your own, like taking off a band-aid.  If there is a gel substance under the  lead , simply wipe/clean it off with a washcloth or paper towel.      Q:  What can I do to minimize constipation (very hard stools, or lack of stools)?  A:  Stay well hydrated.  Increase your dietary fiber intake or take a fiber supplement -with plenty of water.  Walk around frequently.  You may consider an over-the-counter stool-softener.  Your Pharmacist can assist you with choosing one that is stocked at your pharmacy.  Constipation is also one of the most common side effects of pain medication.  If you are using pain medication, be pro-active and try to PREVENT problems with constipation by taking the steps above BEFORE constipation becomes a problem.    Q:  What do I do if I need more pain medications?  A:  Call the office to receive refills.  Be aware that certain pain meds cannot be called into a pharmacy and actually require a paper prescription.  A change may be made in your pain med as you progress thru your recovery period or if you have side effects to certain meds.    --Pain meds are NOT refilled after 5pm on weekdays, and NOT AT ALL on the weekends, so please look ahead to prevent problems.      Q:  Why am I having a hard time sleeping now that I am at home?  A:  Many medications you receive while you are in the hospital can impact your sleep for a number of days after your surgery/hospitalization.  Decreased level of activity and naps during the day may also make sleeping at night difficult.  Try to minimize day-time naps, and get up frequently during the day to walk around your home during your recovery time.  Sleep aides may be of some help, but are not recommended for long-term use.      Q:  I am having some back discomfort.  What should I do?  A:  This may be related to certain positioning that was required for your surgery,  extended periods of time in bed, or other changes in your overall activity level.  You may try ice, heat, acetaminophen, or ibuprofen to treat this temporarily.  Note that many pain medications have acetaminophen in them and would state this on the prescription bottle.  Be sure not to exceed the maximum of 4000mg per day of acetaminophen.     **If the pain you are having does not resolve, is severe, or is a flare of back pain you have had on other occasions prior to surgery, please contact your primary physician for further recommendations or for an appointment to be examined at their office.    Q:  Why am I having headaches?  A:  Headaches can be caused by many things:  caffeine withdrawal, use of pain meds, dehydration, high blood pressure, lack of sleep, over-activity/exhaustion, flare-up of usual migraine headaches.  If you feel this is related to muscle tension (a band-like feeling around the head, or a pressure at the low-back of the head) you may try ice or heat to this area.  You may need to drink more fluids (try electrolyte drink like Gatorade), rest, or take your usual migraine medications.   **If your headaches do not resolve, worsen, are accompanied by other symptoms, or if your blood pressure is high, please call your primary physician for recommendation and/or examination.    Q:  I am unable to urinate.  What do I do?  A:  A small percentage of people can have difficulty urinating initially after surgery.  This includes being able to urinate only a very small amount at a time and feeling discomfort or pressure in the very low abdomen.  This is called  urinary retention , and is actually an urgent situation.  Proceed to your nearest Emergency department for evaluation (not an Urgent Care Center).  Sometimes the bladder does not work correctly after certain medications you receive during surgery, or related to certain procedures.  You may need to have a catheter placed until your bladder recovers.  When  planning to go to an Emergency department, it may help to call the ER to let them know you are coming in for this problem after a surgery.  This may help you get in quicker to be evaluated.  **If you have symptoms of a urinary tract infection, please contact your primary physician for the proper evaluation and treatment.        If you have other questions, please call the office Monday thru Friday between 8am and 4:30pm to discuss with the nurse or physician assistant.  [phone]    There is a surgeon ON CALL on weekday evenings and over the weekend in case of urgent need only, and may be contacted at the same number.    If you are having an emergency, call 911 or proceed to your nearest emergency department.      You received Toradol, an IV form of Ibuprofen (Motrin) at 11:15 AM.  Do not take any Ibuprofen products until 5:15 PM.

## 2025-05-15 NOTE — ANESTHESIA PREPROCEDURE EVALUATION
Anesthesia Pre-Procedure Evaluation    Patient: Jacinto Cruz IV   MRN: [id number] : 1985          Procedure : Procedure(s):  EXCISION HEAD MASS         Past Medical History:   Diagnosis Date    Gastro-oesophageal reflux disease       Past Surgical History:   Procedure Laterality Date    DISCECTOMY LUMBAR POSTERIOR MICROSCOPIC ONE LEVEL Left 2015    Procedure: DISCECTOMY LUMBAR POSTERIOR MICROSCOPIC ONE LEVEL;  Surgeon: Gm Griggs MD;  Location: [facility] OR    ORTHOPEDIC SURGERY      left elbow orif      Allergies   Allergen Reactions    Bee Venom Shortness Of Breath and Hives    Aspartame Hives    Ibuprofen     Naproxen      Has kidney issues with it      Social History     Tobacco Use    Smoking status: Former    Smokeless tobacco: Former    Tobacco comments:     quit 14 at 1230   Substance Use Topics    Alcohol use: Yes     Comment: rare      Wt Readings from Last 1 Encounters:   05/15/25 80.8 kg (178 lb 3.2 oz)        Anesthesia Evaluation   Pt has had prior anesthetic.         ROS/MED HX  ENT/Pulmonary:  - neg pulmonary ROS     Neurologic:  - neg neurologic ROS     Cardiovascular:  - neg cardiovascular ROS     METS/Exercise Tolerance:     Hematologic:  - neg hematologic  ROS     Musculoskeletal:   (+)  arthritis,             GI/Hepatic:     (+) GERD, Asymptomatic on medication,                  Renal/Genitourinary:  - neg Renal ROS     Endo:  - neg endo ROS     Psychiatric/Substance Use:  - neg psychiatric ROS     Infectious Disease:  - neg infectious disease ROS     Malignancy:       Other:              Physical Exam  Airway  Mallampati: II  TM distance: >3 FB  Neck ROM: full  Mouth opening: >= 4 cm    Cardiovascular   Rhythm: regular  Rate: normal rate     Dental   (+) Modest Abnormalities - crowns, retainers, 1 or 2 missing teeth      Pulmonary - normal exam      Neurological - normal exam  He appears awake, alert and oriented x3.    Other Findings       OUTSIDE LABS:  CBC:   Lab Results  "  Component Value Date    WBC 7.1 03/29/2025    WBC 7.4 02/12/2025    HGB 13.7 03/29/2025    HGB 14.3 02/12/2025    HCT 42.4 03/29/2025    HCT 43.9 02/12/2025     03/29/2025     02/12/2025     BMP:   Lab Results   Component Value Date     03/29/2025     10/25/2024    POTASSIUM 4.8 03/29/2025    POTASSIUM 4.1 10/25/2024    CHLORIDE 108 03/29/2025    CHLORIDE 102 10/25/2024    CO2 28 03/29/2025    CO2 23 10/25/2024    BUN 15 03/29/2025    BUN 9.0 10/25/2024    CR 1.00 03/29/2025    CR 0.97 10/25/2024    GLC 87 03/29/2025     (H) 10/25/2024     COAGS:   Lab Results   Component Value Date    INR 0.93 04/12/2018     POC: No results found for: \"BGM\", \"HCG\", \"HCGS\"  HEPATIC:   Lab Results   Component Value Date    ALBUMIN 3.9 03/29/2025    PROTTOTAL 7.2 03/29/2025    ALT 23 03/29/2025    AST 29 03/29/2025    GGT 8 03/23/2018    ALKPHOS 78 03/29/2025    BILITOTAL 0.7 03/29/2025     OTHER:   Lab Results   Component Value Date    A1C 5.1 10/25/2024    MARLY 9.5 03/29/2025    PHOS 2.6 05/11/2018    LIPASE 101 01/11/2017    TSH 0.72 10/25/2024    CRP <2.9 03/23/2018    SED 10 03/23/2018       Anesthesia Plan    ASA Status:  2      NPO Status: NPO Appropriate   Anesthesia Type: General.  Airway: oral.  Induction: intravenous.  Maintenance: Balanced.   Techniques and Equipment:       - Monitoring Plan: standard ASA monitoring     Consents    Anesthesia Plan(s) and associated risks, benefits, and realistic alternatives discussed. Questions answered and patient/representative(s) expressed understanding.     - Discussed: anesthesiologist, CRNA     - Discussed with:  Patient        - Pt is DNR/DNI Status: no DNR     Blood Consent:      - Discussed with: not discussed.     Postoperative Care    Pain management: non-narcotic analgesics, plan for postoperative opioid use, multimodal analgesia.     Comments:                   Tristen Whitman MD        Clinically Significant Risk Factors Present on " Admission

## 2025-05-19 LAB
PATH REPORT.COMMENTS IMP SPEC: NORMAL
PATH REPORT.COMMENTS IMP SPEC: NORMAL
PATH REPORT.FINAL DX SPEC: NORMAL
PATH REPORT.GROSS SPEC: NORMAL
PATH REPORT.MICROSCOPIC SPEC OTHER STN: NORMAL
PATH REPORT.RELEVANT HX SPEC: NORMAL
PHOTO IMAGE: NORMAL

## 2025-05-19 PROCEDURE — 88304 TISSUE EXAM BY PATHOLOGIST: CPT | Mod: 26 | Performed by: PATHOLOGY

## 2025-05-20 ENCOUNTER — RESULTS FOLLOW-UP (OUTPATIENT)
Dept: SURGERY | Facility: CLINIC | Age: 40
End: 2025-05-20

## 2025-05-22 ENCOUNTER — LAB REQUISITION (OUTPATIENT)
Dept: LAB | Facility: CLINIC | Age: 40
End: 2025-05-22
Payer: COMMERCIAL

## 2025-05-22 DIAGNOSIS — J30.89 OTHER ALLERGIC RHINITIS: ICD-10-CM

## 2025-05-22 DIAGNOSIS — J34.89 OTHER SPECIFIED DISORDERS OF NOSE AND NASAL SINUSES: ICD-10-CM

## 2025-05-22 PROCEDURE — 82785 ASSAY OF IGE: CPT | Mod: ORL | Performed by: STUDENT IN AN ORGANIZED HEALTH CARE EDUCATION/TRAINING PROGRAM

## 2025-05-23 LAB
A ALTERNATA IGE QN: <0.1 KU(A)/L
A FUMIGATUS IGE QN: <0.1 KU(A)/L
BERMUDA GRASS IGE QN: <0.1 KU(A)/L
C HERBARUM IGE QN: <0.1 KU(A)/L
CAT DANDER IGG QN: <0.1 KU(A)/L
CEDAR IGE QN: ABNORMAL
COMMON RAGWEED IGE QN: ABNORMAL
COTTONWOOD IGE QN: <0.1 KU(A)/L
D FARINAE IGE QN: <0.1 KU(A)/L
D PTERONYSS IGE QN: <0.1 KU(A)/L
DOG DANDER+EPITH IGE QN: <0.1 KU(A)/L
IGE SERPL-ACNC: 67 KU/L (ref 0–114)
MAPLE IGE QN: <0.1 KU(A)/L
MARSH ELDER IGE QN: ABNORMAL
MOUSE URINE PROT IGE QN: <0.1 KU(A)/L
NETTLE IGE QN: ABNORMAL
P NOTATUM IGE QN: <0.1 KU(A)/L
ROACH IGE QN: 0.11 KU(A)/L
SALTWORT IGE QN: ABNORMAL
SILVER BIRCH IGE QN: ABNORMAL
TIMOTHY IGE QN: <0.1 KU(A)/L
WHITE ASH IGE QN: <0.1 KU(A)/L
WHITE ELM IGE QN: ABNORMAL
WHITE MULBERRY IGE QN: ABNORMAL
WHITE OAK IGE QN: ABNORMAL

## 2025-05-29 ENCOUNTER — OFFICE VISIT (OUTPATIENT)
Dept: SURGERY | Facility: CLINIC | Age: 40
End: 2025-05-29
Payer: COMMERCIAL

## 2025-05-29 ENCOUNTER — LAB REQUISITION (OUTPATIENT)
Dept: LAB | Facility: CLINIC | Age: 40
End: 2025-05-29
Payer: COMMERCIAL

## 2025-05-29 VITALS
DIASTOLIC BLOOD PRESSURE: 94 MMHG | WEIGHT: 185 LBS | BODY MASS INDEX: 25.9 KG/M2 | HEART RATE: 77 BPM | HEIGHT: 71 IN | SYSTOLIC BLOOD PRESSURE: 130 MMHG | OXYGEN SATURATION: 97 % | RESPIRATION RATE: 16 BRPM

## 2025-05-29 DIAGNOSIS — Z09 SURGERY FOLLOW-UP: Primary | ICD-10-CM

## 2025-05-29 DIAGNOSIS — J30.89 OTHER ALLERGIC RHINITIS: ICD-10-CM

## 2025-05-29 PROCEDURE — 86003 ALLG SPEC IGE CRUDE XTRC EA: CPT | Mod: ORL | Performed by: STUDENT IN AN ORGANIZED HEALTH CARE EDUCATION/TRAINING PROGRAM

## 2025-05-29 NOTE — LETTER
May 29, 2025      RE:   Jacinto Cruz IV 1985      Dear Colleague,    Thank you for referring your patient, Jacinto Cruz IV, to [facility]. Please see a copy of my visit note below.    The patient returns today to follow-up after excision of his posterior scalp mass.  Pathology revealed this to be a benign lipoma.    Sutures are removed today.  The incision is well-healed.    The patient is doing well.  He may return to see me on an as needed basis.          Again, thank you for allowing me to participate in the care of your patient.      Sincerely,      Jere Vale MD

## 2025-05-30 LAB — NETTLE IGE QN: <0.1 KU(A)/L

## 2025-06-02 LAB
CEDAR IGE QN: <0.1 KU(A)/L
COMMON RAGWEED IGE QN: <0.1 KU(A)/L
MARSH ELDER IGE QN: <0.1 KU(A)/L
SALTWORT IGE QN: <0.1 KU(A)/L
SILVER BIRCH IGE QN: <0.1 KU(A)/L
WHITE ELM IGE QN: <0.1 KU(A)/L
WHITE MULBERRY IGE QN: <0.1 KU(A)/L
WHITE OAK IGE QN: <0.1 KU(A)/L

## 2025-08-03 ENCOUNTER — ANCILLARY PROCEDURE (OUTPATIENT)
Dept: GENERAL RADIOLOGY | Facility: CLINIC | Age: 40
End: 2025-08-03
Attending: FAMILY MEDICINE
Payer: COMMERCIAL

## 2025-08-03 ENCOUNTER — OFFICE VISIT (OUTPATIENT)
Dept: URGENT CARE | Facility: URGENT CARE | Age: 40
End: 2025-08-03
Payer: COMMERCIAL

## 2025-08-03 VITALS
SYSTOLIC BLOOD PRESSURE: 131 MMHG | TEMPERATURE: 98.5 F | RESPIRATION RATE: 14 BRPM | DIASTOLIC BLOOD PRESSURE: 86 MMHG | OXYGEN SATURATION: 100 % | HEART RATE: 66 BPM | WEIGHT: 177.5 LBS | HEIGHT: 71 IN | BODY MASS INDEX: 24.85 KG/M2

## 2025-08-03 DIAGNOSIS — M25.552 HIP PAIN, LEFT: Primary | ICD-10-CM

## 2025-08-03 DIAGNOSIS — M25.552 HIP PAIN, LEFT: ICD-10-CM

## 2025-08-03 PROCEDURE — 20610 DRAIN/INJ JOINT/BURSA W/O US: CPT | Mod: LT | Performed by: FAMILY MEDICINE

## 2025-08-03 PROCEDURE — 73502 X-RAY EXAM HIP UNI 2-3 VIEWS: CPT | Mod: TC | Performed by: RADIOLOGY

## 2025-08-03 RX ORDER — TRIAMCINOLONE ACETONIDE 40 MG/ML
40 INJECTION, SUSPENSION INTRA-ARTICULAR; INTRAMUSCULAR ONCE
Status: COMPLETED | OUTPATIENT
Start: 2025-08-03 | End: 2025-08-03

## 2025-08-03 RX ADMIN — TRIAMCINOLONE ACETONIDE 40 MG: 40 INJECTION, SUSPENSION INTRA-ARTICULAR; INTRAMUSCULAR at 16:13

## 2025-08-04 ENCOUNTER — PATIENT OUTREACH (OUTPATIENT)
Dept: CARE COORDINATION | Facility: CLINIC | Age: 40
End: 2025-08-04
Payer: COMMERCIAL

## 2025-08-07 ENCOUNTER — OFFICE VISIT (OUTPATIENT)
Dept: ORTHOPEDICS | Facility: CLINIC | Age: 40
End: 2025-08-07
Attending: FAMILY MEDICINE
Payer: COMMERCIAL

## 2025-08-07 DIAGNOSIS — M65.20 CALCIFIC TENDINITIS: Primary | ICD-10-CM

## 2025-08-07 DIAGNOSIS — M67.959 TENDINOPATHY OF GLUTEAL REGION: ICD-10-CM

## 2025-08-07 DIAGNOSIS — M25.552 HIP PAIN, LEFT: ICD-10-CM

## 2025-08-07 RX ORDER — PREDNISONE 20 MG/1
TABLET ORAL
Qty: 14 TABLET | Refills: 0 | Status: SHIPPED | OUTPATIENT
Start: 2025-08-07 | End: 2025-08-17

## (undated) DEVICE — SYR 10ML FINGER CONTROL W/O NDL 309695

## (undated) DEVICE — SYR EAR BULB 3OZ 0035830

## (undated) DEVICE — DRAPE LAP PEDS DISP 29492

## (undated) DEVICE — SU ETHILON 3-0 PS-2 18" 1669H

## (undated) DEVICE — SU PROLENE 3-0 PS-1 18" 8663G

## (undated) DEVICE — TUBING SUCTION 12"X1/4" N612

## (undated) DEVICE — PREP POVIDONE-IODINE 7.5% SCRUB 4OZ BOTTLE MDS093945

## (undated) DEVICE — GOWN IMPERVIOUS ZONED XLG 9041

## (undated) DEVICE — BAG CLEAR TRASH 1.3M 39X33" P4040C

## (undated) DEVICE — PACK MINOR CUSTOM RIDGES SBA32RMRMA

## (undated) DEVICE — LINEN FULL SHEET 5511

## (undated) DEVICE — LINEN HALF SHEET 5512

## (undated) DEVICE — GLOVE PROTEXIS W/NEU-THERA 8.0  2D73TE80

## (undated) DEVICE — SPONGE LAP 18X18" X8435

## (undated) DEVICE — ESU GROUND PAD ADULT W/CORD E7507

## (undated) DEVICE — GLOVE PROTEXIS W/NEU-THERA 7.5  2D73TE75

## (undated) DEVICE — PREP POVIDONE IODINE SOLUTION 10% 4OZ BOTTLE 29906-004

## (undated) DEVICE — TRAY PREP DRY SKIN SCRUB 067

## (undated) DEVICE — LINEN TOWEL PACK X10 5473

## (undated) DEVICE — SUCTION TIP YANKAUER W/O VENT K86

## (undated) RX ORDER — FENTANYL CITRATE 50 UG/ML
INJECTION, SOLUTION INTRAMUSCULAR; INTRAVENOUS
Status: DISPENSED
Start: 2025-05-15

## (undated) RX ORDER — PROPOFOL 10 MG/ML
INJECTION, EMULSION INTRAVENOUS
Status: DISPENSED
Start: 2025-05-15

## (undated) RX ORDER — ONDANSETRON 2 MG/ML
INJECTION INTRAMUSCULAR; INTRAVENOUS
Status: DISPENSED
Start: 2025-05-15

## (undated) RX ORDER — CEFAZOLIN SODIUM/WATER 2 G/20 ML
SYRINGE (ML) INTRAVENOUS
Status: DISPENSED
Start: 2025-05-15

## (undated) RX ORDER — DEXAMETHASONE SODIUM PHOSPHATE 4 MG/ML
INJECTION, SOLUTION INTRA-ARTICULAR; INTRALESIONAL; INTRAMUSCULAR; INTRAVENOUS; SOFT TISSUE
Status: DISPENSED
Start: 2025-05-15

## (undated) RX ORDER — KETOROLAC TROMETHAMINE 30 MG/ML
INJECTION, SOLUTION INTRAMUSCULAR; INTRAVENOUS
Status: DISPENSED
Start: 2025-05-15

## (undated) RX ORDER — LIDOCAINE HYDROCHLORIDE AND EPINEPHRINE 10; 10 MG/ML; UG/ML
INJECTION, SOLUTION INFILTRATION; PERINEURAL
Status: DISPENSED
Start: 2025-05-15

## (undated) RX ORDER — CLONIDINE HYDROCHLORIDE 0.1 MG/1
TABLET ORAL
Status: DISPENSED
Start: 2018-04-12

## (undated) RX ORDER — BUPIVACAINE HYDROCHLORIDE 5 MG/ML
INJECTION, SOLUTION EPIDURAL; INTRACAUDAL; PERINEURAL
Status: DISPENSED
Start: 2025-05-15

## (undated) RX ORDER — GINSENG 100 MG
CAPSULE ORAL
Status: DISPENSED
Start: 2025-05-15

## (undated) RX ORDER — TETRACAINE HYDROCHLORIDE 5 MG/ML
SOLUTION OPHTHALMIC
Status: DISPENSED
Start: 2022-10-30

## (undated) RX ORDER — LIDOCAINE HYDROCHLORIDE 10 MG/ML
INJECTION, SOLUTION EPIDURAL; INFILTRATION; INTRACAUDAL; PERINEURAL
Status: DISPENSED
Start: 2025-05-15